# Patient Record
Sex: FEMALE | Race: WHITE | NOT HISPANIC OR LATINO | URBAN - METROPOLITAN AREA
[De-identification: names, ages, dates, MRNs, and addresses within clinical notes are randomized per-mention and may not be internally consistent; named-entity substitution may affect disease eponyms.]

---

## 2017-06-06 ENCOUNTER — APPOINTMENT (RX ONLY)
Dept: URBAN - METROPOLITAN AREA CLINIC 349 | Facility: CLINIC | Age: 22
Setting detail: DERMATOLOGY
End: 2017-06-06

## 2017-06-06 DIAGNOSIS — L73.9 FOLLICULAR DISORDER, UNSPECIFIED: ICD-10-CM

## 2017-06-06 DIAGNOSIS — L81.0 POSTINFLAMMATORY HYPERPIGMENTATION: ICD-10-CM

## 2017-06-06 DIAGNOSIS — L738 OTHER SPECIFIED DISEASES OF HAIR AND HAIR FOLLICLES: ICD-10-CM

## 2017-06-06 DIAGNOSIS — L70.0 ACNE VULGARIS: ICD-10-CM

## 2017-06-06 DIAGNOSIS — L663 OTHER SPECIFIED DISEASES OF HAIR AND HAIR FOLLICLES: ICD-10-CM

## 2017-06-06 PROBLEM — L02.425 FURUNCLE OF RIGHT LOWER LIMB: Status: ACTIVE | Noted: 2017-06-06

## 2017-06-06 PROBLEM — L02.426 FURUNCLE OF LEFT LOWER LIMB: Status: ACTIVE | Noted: 2017-06-06

## 2017-06-06 PROCEDURE — 99203 OFFICE O/P NEW LOW 30 MIN: CPT

## 2017-06-06 PROCEDURE — ? PRESCRIPTION

## 2017-06-06 PROCEDURE — ? COUNSELING

## 2017-06-06 PROCEDURE — ? TREATMENT REGIMEN

## 2017-06-06 RX ORDER — CLINDAMYCIN PHOSPHATE AND TRETINOIN 10; .25 MG/G; MG/G
GEL TOPICAL
Qty: 1 | Refills: 2 | Status: ERX | COMMUNITY
Start: 2017-06-06

## 2017-06-06 RX ORDER — DOXYCYCLINE HYCLATE 150 MG/1
TABLET, COATED ORAL
Qty: 30 | Refills: 2 | Status: ERX | COMMUNITY
Start: 2017-06-06

## 2017-06-06 RX ORDER — SPIRONOLACTONE 25 MG/1
TABLET, FILM COATED ORAL
Qty: 60 | Refills: 2 | Status: ERX | COMMUNITY
Start: 2017-06-06

## 2017-06-06 RX ADMIN — SPIRONOLACTONE: 25 TABLET, FILM COATED ORAL at 15:37

## 2017-06-06 RX ADMIN — CLINDAMYCIN PHOSPHATE AND TRETINOIN: 10; .25 GEL TOPICAL at 15:39

## 2017-06-06 RX ADMIN — DOXYCYCLINE HYCLATE: 150 TABLET, COATED ORAL at 15:37

## 2017-06-06 ASSESSMENT — LOCATION DETAILED DESCRIPTION DERM
LOCATION DETAILED: RIGHT DISTAL PRETIBIAL REGION
LOCATION DETAILED: RIGHT SUPERIOR LATERAL UPPER BACK
LOCATION DETAILED: INFERIOR THORACIC SPINE
LOCATION DETAILED: RIGHT POSTERIOR SHOULDER
LOCATION DETAILED: LEFT ANTERIOR DISTAL THIGH
LOCATION DETAILED: SUPERIOR LUMBAR SPINE
LOCATION DETAILED: RIGHT ANTERIOR DISTAL THIGH
LOCATION DETAILED: LEFT SUPERIOR UPPER BACK
LOCATION DETAILED: LEFT POSTERIOR SHOULDER
LOCATION DETAILED: LEFT PROXIMAL PRETIBIAL REGION

## 2017-06-06 ASSESSMENT — LOCATION SIMPLE DESCRIPTION DERM
LOCATION SIMPLE: LEFT UPPER BACK
LOCATION SIMPLE: RIGHT PRETIBIAL REGION
LOCATION SIMPLE: LEFT PRETIBIAL REGION
LOCATION SIMPLE: UPPER BACK
LOCATION SIMPLE: RIGHT THIGH
LOCATION SIMPLE: LEFT SHOULDER
LOCATION SIMPLE: RIGHT BACK
LOCATION SIMPLE: LOWER BACK
LOCATION SIMPLE: RIGHT SHOULDER
LOCATION SIMPLE: LEFT THIGH

## 2017-06-06 ASSESSMENT — LOCATION ZONE DERM
LOCATION ZONE: LEG
LOCATION ZONE: TRUNK
LOCATION ZONE: ARM

## 2017-06-06 NOTE — PROCEDURE: TREATMENT REGIMEN
Initiate Treatment: Spironolactone 25 mg to take 1 twice daily. Veltin to use pea size amount on face every night
Detail Level: Zone
Plan: Doxycycline 150 mg prescripbed for another diagnosis will treat this as well

## 2017-06-20 ENCOUNTER — RX ONLY (OUTPATIENT)
Age: 22
Setting detail: RX ONLY
End: 2017-06-20

## 2017-06-20 RX ORDER — CLINDAMYCIN PHOSPHATE AND TRETINOIN 10; .25 MG/G; MG/G
GEL TOPICAL
Qty: 1 | Refills: 2 | Status: ERX

## 2017-08-02 ENCOUNTER — APPOINTMENT (RX ONLY)
Dept: URBAN - METROPOLITAN AREA CLINIC 349 | Facility: CLINIC | Age: 22
Setting detail: DERMATOLOGY
End: 2017-08-02

## 2017-08-02 DIAGNOSIS — L70.0 ACNE VULGARIS: ICD-10-CM | Status: IMPROVED

## 2017-08-02 DIAGNOSIS — L73.9 FOLLICULAR DISORDER, UNSPECIFIED: ICD-10-CM | Status: IMPROVED

## 2017-08-02 DIAGNOSIS — L738 OTHER SPECIFIED DISEASES OF HAIR AND HAIR FOLLICLES: ICD-10-CM | Status: IMPROVED

## 2017-08-02 DIAGNOSIS — L663 OTHER SPECIFIED DISEASES OF HAIR AND HAIR FOLLICLES: ICD-10-CM | Status: IMPROVED

## 2017-08-02 PROBLEM — L02.425 FURUNCLE OF RIGHT LOWER LIMB: Status: ACTIVE | Noted: 2017-08-02

## 2017-08-02 PROBLEM — L02.426 FURUNCLE OF LEFT LOWER LIMB: Status: ACTIVE | Noted: 2017-08-02

## 2017-08-02 PROCEDURE — ? TREATMENT REGIMEN

## 2017-08-02 PROCEDURE — ? COUNSELING

## 2017-08-02 PROCEDURE — ? PRESCRIPTION

## 2017-08-02 PROCEDURE — 99213 OFFICE O/P EST LOW 20 MIN: CPT

## 2017-08-02 RX ORDER — SPIRONOLACTONE 25 MG/1
TABLET, FILM COATED ORAL
Qty: 60 | Refills: 5 | Status: ERX

## 2017-08-02 RX ORDER — DOXYCYCLINE HYCLATE 150 MG/1
TABLET, FILM COATED ORAL
Qty: 30 | Refills: 4 | Status: ERX

## 2017-08-02 ASSESSMENT — LOCATION DETAILED DESCRIPTION DERM
LOCATION DETAILED: LEFT INFERIOR CENTRAL MALAR CHEEK
LOCATION DETAILED: RIGHT INFERIOR CENTRAL MALAR CHEEK
LOCATION DETAILED: INFERIOR MID FOREHEAD
LOCATION DETAILED: LEFT ANTERIOR DISTAL THIGH
LOCATION DETAILED: RIGHT CHIN
LOCATION DETAILED: RIGHT ANTERIOR DISTAL THIGH

## 2017-08-02 ASSESSMENT — LOCATION ZONE DERM
LOCATION ZONE: LEG
LOCATION ZONE: FACE

## 2017-08-02 ASSESSMENT — LOCATION SIMPLE DESCRIPTION DERM
LOCATION SIMPLE: CHIN
LOCATION SIMPLE: INFERIOR FOREHEAD
LOCATION SIMPLE: RIGHT CHEEK
LOCATION SIMPLE: LEFT CHEEK
LOCATION SIMPLE: RIGHT THIGH
LOCATION SIMPLE: LEFT THIGH

## 2017-08-02 NOTE — PROCEDURE: TREATMENT REGIMEN
Continue Regimen: Current regimen on Doxycycline 150 to take 1 time Daily
Continue Regimen: Current regimen of Spironolactone 25 mgs twice daily, Veltin to use pea size amount on face at night
Detail Level: Zone

## 2018-07-19 ENCOUNTER — APPOINTMENT (RX ONLY)
Dept: URBAN - METROPOLITAN AREA CLINIC 349 | Facility: CLINIC | Age: 23
Setting detail: DERMATOLOGY
End: 2018-07-19

## 2018-07-19 DIAGNOSIS — L738 OTHER SPECIFIED DISEASES OF HAIR AND HAIR FOLLICLES: ICD-10-CM | Status: INADEQUATELY CONTROLLED

## 2018-07-19 DIAGNOSIS — L663 OTHER SPECIFIED DISEASES OF HAIR AND HAIR FOLLICLES: ICD-10-CM | Status: INADEQUATELY CONTROLLED

## 2018-07-19 DIAGNOSIS — L73.9 FOLLICULAR DISORDER, UNSPECIFIED: ICD-10-CM | Status: INADEQUATELY CONTROLLED

## 2018-07-19 PROBLEM — L02.426 FURUNCLE OF LEFT LOWER LIMB: Status: ACTIVE | Noted: 2018-07-19

## 2018-07-19 PROBLEM — L02.425 FURUNCLE OF RIGHT LOWER LIMB: Status: ACTIVE | Noted: 2018-07-19

## 2018-07-19 PROCEDURE — ? PRESCRIPTION

## 2018-07-19 PROCEDURE — ? RECOMMENDATIONS

## 2018-07-19 PROCEDURE — ? COUNSELING

## 2018-07-19 PROCEDURE — 99213 OFFICE O/P EST LOW 20 MIN: CPT

## 2018-07-19 RX ORDER — CLINDAMYCIN PHOSPHATE 10 MG/G
GEL TOPICAL
Qty: 1 | Refills: 2 | Status: ERX | COMMUNITY
Start: 2018-07-19

## 2018-07-19 RX ORDER — FLUTICASONE PROPIONATE 0.05 MG/G
OINTMENT TOPICAL
Qty: 1 | Refills: 1 | Status: ERX | COMMUNITY
Start: 2018-07-19

## 2018-07-19 RX ADMIN — FLUTICASONE PROPIONATE: 0.05 OINTMENT TOPICAL at 15:51

## 2018-07-19 RX ADMIN — CLINDAMYCIN PHOSPHATE: 10 GEL TOPICAL at 15:50

## 2018-07-19 ASSESSMENT — LOCATION ZONE DERM: LOCATION ZONE: LEG

## 2018-07-19 ASSESSMENT — LOCATION SIMPLE DESCRIPTION DERM
LOCATION SIMPLE: RIGHT THIGH
LOCATION SIMPLE: LEFT THIGH

## 2018-07-19 ASSESSMENT — LOCATION DETAILED DESCRIPTION DERM
LOCATION DETAILED: RIGHT ANTERIOR DISTAL THIGH
LOCATION DETAILED: LEFT ANTERIOR DISTAL THIGH

## 2018-07-19 NOTE — PROCEDURE: RECOMMENDATIONS
Recommendations (Free Text): Stop doxycycline \\nClindamycin gel twice daily\\nStop picking\\nFluticasone ointment twice daily to inflammed area x2 wks\\n\\nIf it fails to resolve consider biopsy (include PLEVA in differential)
Detail Level: Zone

## 2019-05-16 ENCOUNTER — APPOINTMENT (RX ONLY)
Dept: URBAN - METROPOLITAN AREA CLINIC 349 | Facility: CLINIC | Age: 24
Setting detail: DERMATOLOGY
End: 2019-05-16

## 2019-05-16 ENCOUNTER — RX ONLY (OUTPATIENT)
Age: 24
Setting detail: RX ONLY
End: 2019-05-16

## 2019-05-16 DIAGNOSIS — L73.9 FOLLICULAR DISORDER, UNSPECIFIED: ICD-10-CM | Status: RESOLVING

## 2019-05-16 DIAGNOSIS — B36.0 PITYRIASIS VERSICOLOR: ICD-10-CM

## 2019-05-16 DIAGNOSIS — L738 OTHER SPECIFIED DISEASES OF HAIR AND HAIR FOLLICLES: ICD-10-CM | Status: RESOLVING

## 2019-05-16 DIAGNOSIS — L70.0 ACNE VULGARIS: ICD-10-CM | Status: UNCHANGED

## 2019-05-16 DIAGNOSIS — L663 OTHER SPECIFIED DISEASES OF HAIR AND HAIR FOLLICLES: ICD-10-CM | Status: RESOLVING

## 2019-05-16 PROBLEM — L02.426 FURUNCLE OF LEFT LOWER LIMB: Status: ACTIVE | Noted: 2019-05-16

## 2019-05-16 PROBLEM — L02.425 FURUNCLE OF RIGHT LOWER LIMB: Status: ACTIVE | Noted: 2019-05-16

## 2019-05-16 PROCEDURE — ? PRESCRIPTION

## 2019-05-16 PROCEDURE — ? COUNSELING

## 2019-05-16 PROCEDURE — ? OTHER

## 2019-05-16 PROCEDURE — 99214 OFFICE O/P EST MOD 30 MIN: CPT

## 2019-05-16 PROCEDURE — ? TREATMENT REGIMEN

## 2019-05-16 RX ORDER — DOXYCYCLINE HYCLATE 100 MG/1
TABLET, COATED ORAL
Qty: 30 | Refills: 2 | Status: ERX | COMMUNITY
Start: 2019-05-16

## 2019-05-16 RX ORDER — DAPSONE 50 MG/G
GEL TOPICAL
Qty: 1 | Refills: 2 | Status: ERX | COMMUNITY
Start: 2019-05-16

## 2019-05-16 RX ORDER — IODOQUINOL, HYDROCORTISONE ACETATE AND ALOE VERA LEAF 10; 20; 10 MG/G; MG/G; MG/G
GEL TOPICAL
Qty: 1 | Refills: 2 | Status: ERX | COMMUNITY
Start: 2019-05-16

## 2019-05-16 RX ORDER — SPIRONOLACTONE 50 MG/1
TABLET, FILM COATED ORAL
Qty: 30 | Refills: 6 | Status: ERX | COMMUNITY
Start: 2019-05-16

## 2019-05-16 RX ORDER — DAPSONE 75 MG/G
GEL TOPICAL
Qty: 1 | Refills: 6 | Status: ERX | COMMUNITY
Start: 2019-05-16

## 2019-05-16 RX ADMIN — SPIRONOLACTONE: 50 TABLET, FILM COATED ORAL at 14:41

## 2019-05-16 RX ADMIN — DOXYCYCLINE HYCLATE: 100 TABLET, COATED ORAL at 14:41

## 2019-05-16 RX ADMIN — IODOQUINOL, HYDROCORTISONE ACETATE AND ALOE VERA LEAF: 10; 20; 10 GEL TOPICAL at 14:39

## 2019-05-16 RX ADMIN — DAPSONE: 75 GEL TOPICAL at 14:41

## 2019-05-16 ASSESSMENT — LOCATION SIMPLE DESCRIPTION DERM
LOCATION SIMPLE: LEFT THIGH
LOCATION SIMPLE: INFERIOR FOREHEAD
LOCATION SIMPLE: RIGHT CHEEK
LOCATION SIMPLE: LEFT CHEEK
LOCATION SIMPLE: ABDOMEN
LOCATION SIMPLE: CHIN
LOCATION SIMPLE: RIGHT THIGH

## 2019-05-16 ASSESSMENT — LOCATION DETAILED DESCRIPTION DERM
LOCATION DETAILED: LEFT LATERAL ABDOMEN
LOCATION DETAILED: LEFT INFERIOR CENTRAL MALAR CHEEK
LOCATION DETAILED: INFERIOR MID FOREHEAD
LOCATION DETAILED: RIGHT ANTERIOR DISTAL THIGH
LOCATION DETAILED: LEFT ANTERIOR DISTAL THIGH
LOCATION DETAILED: RIGHT INFERIOR CENTRAL MALAR CHEEK
LOCATION DETAILED: RIGHT CHIN

## 2019-05-16 ASSESSMENT — LOCATION ZONE DERM
LOCATION ZONE: LEG
LOCATION ZONE: TRUNK
LOCATION ZONE: FACE

## 2019-05-16 NOTE — PROCEDURE: COUNSELING
Minocycline Pregnancy And Lactation Text: This medication is Pregnancy Category D and not consider safe during pregnancy. It is also excreted in breast milk.
Topical Clindamycin Pregnancy And Lactation Text: This medication is Pregnancy Category B and is considered safe during pregnancy. It is unknown if it is excreted in breast milk.
Azithromycin Pregnancy And Lactation Text: This medication is considered safe during pregnancy and is also secreted in breast milk.
Topical Sulfur Applications Counseling: Topical Sulfur Counseling: Patient counseled that this medication may cause skin irritation or allergic reactions.  In the event of skin irritation, the patient was advised to reduce the amount of the drug applied or use it less frequently.   The patient verbalized understanding of the proper use and possible adverse effects of topical sulfur application.  All of the patient's questions and concerns were addressed.
Isotretinoin Counseling: Patient should get monthly blood tests, not donate blood, not drive at night if vision affected, not share medication, and not undergo elective surgery for 6 months after tx completed. Side effects reviewed, pt to contact office should one occur.
Doxycycline Counseling:  Patient counseled regarding possible photosensitivity and increased risk for sunburn.  Patient instructed to avoid sunlight, if possible.  When exposed to sunlight, patients should wear protective clothing, sunglasses, and sunscreen.  The patient was instructed to call the office immediately if the following severe adverse effects occur:  hearing changes, easy bruising/bleeding, severe headache, or vision changes.  The patient verbalized understanding of the proper use and possible adverse effects of doxycycline.  All of the patient's questions and concerns were addressed.
Dapsone Counseling: I discussed with the patient the risks of dapsone including but not limited to hemolytic anemia, agranulocytosis, rashes, methemoglobinemia, kidney failure, peripheral neuropathy, headaches, GI upset, and liver toxicity.  Patients who start dapsone require monitoring including baseline LFTs and weekly CBCs for the first month, then every month thereafter.  The patient verbalized understanding of the proper use and possible adverse effects of dapsone.  All of the patient's questions and concerns were addressed.
High Dose Vitamin A Pregnancy And Lactation Text: High dose vitamin A therapy is contraindicated during pregnancy and breast feeding.
Isotretinoin Pregnancy And Lactation Text: This medication is Pregnancy Category X and is considered extremely dangerous during pregnancy. It is unknown if it is excreted in breast milk.
Minocycline Counseling: Patient advised regarding possible photosensitivity and discoloration of the teeth, skin, lips, tongue and gums.  Patient instructed to avoid sunlight, if possible.  When exposed to sunlight, patients should wear protective clothing, sunglasses, and sunscreen.  The patient was instructed to call the office immediately if the following severe adverse effects occur:  hearing changes, easy bruising/bleeding, severe headache, or vision changes.  The patient verbalized understanding of the proper use and possible adverse effects of minocycline.  All of the patient's questions and concerns were addressed.
Topical Clindamycin Counseling: Patient counseled that this medication may cause skin irritation or allergic reactions.  In the event of skin irritation, the patient was advised to reduce the amount of the drug applied or use it less frequently.   The patient verbalized understanding of the proper use and possible adverse effects of clindamycin.  All of the patient's questions and concerns were addressed.
Detail Level: Zone
Topical Retinoid Pregnancy And Lactation Text: This medication is Pregnancy Category C. It is unknown if this medication is excreted in breast milk.
Tetracycline Counseling: Patient counseled regarding possible photosensitivity and increased risk for sunburn.  Patient instructed to avoid sunlight, if possible.  When exposed to sunlight, patients should wear protective clothing, sunglasses, and sunscreen.  The patient was instructed to call the office immediately if the following severe adverse effects occur:  hearing changes, easy bruising/bleeding, severe headache, or vision changes.  The patient verbalized understanding of the proper use and possible adverse effects of tetracycline.  All of the patient's questions and concerns were addressed. Patient understands to avoid pregnancy while on therapy due to potential birth defects.
Topical Sulfur Applications Pregnancy And Lactation Text: This medication is Pregnancy Category C and has an unknown safety profile during pregnancy. It is unknown if this topical medication is excreted in breast milk.
Tazorac Pregnancy And Lactation Text: This medication is not safe during pregnancy. It is unknown if this medication is excreted in breast milk.
Erythromycin Pregnancy And Lactation Text: This medication is Pregnancy Category B and is considered safe during pregnancy. It is also excreted in breast milk.
High Dose Vitamin A Counseling: Side effects reviewed, pt to contact office should one occur.
Dapsone Pregnancy And Lactation Text: This medication is Pregnancy Category C and is not considered safe during pregnancy or breast feeding.
Benzoyl Peroxide Counseling: Patient counseled that medicine may cause skin irritation and bleach clothing.  In the event of skin irritation, the patient was advised to reduce the amount of the drug applied or use it less frequently.   The patient verbalized understanding of the proper use and possible adverse effects of benzoyl peroxide.  All of the patient's questions and concerns were addressed.
Spironolactone Counseling: Patient advised regarding risks of diarrhea, abdominal pain, hyperkalemia, birth defects (for female patients), liver toxicity and renal toxicity. The patient may need blood work to monitor liver and kidney function and potassium levels while on therapy. The patient verbalized understanding of the proper use and possible adverse effects of spironolactone.  All of the patient's questions and concerns were addressed.
Birth Control Pills Counseling: Birth Control Pill Counseling: I discussed with the patient the potential side effects of OCPs including but not limited to increased risk of stroke, heart attack, thrombophlebitis, deep venous thrombosis, hepatic adenomas, breast changes, GI upset, headaches, and depression.  The patient verbalized understanding of the proper use and possible adverse effects of OCPs. All of the patient's questions and concerns were addressed.
Azithromycin Counseling:  I discussed with the patient the risks of azithromycin including but not limited to GI upset, allergic reaction, drug rash, diarrhea, and yeast infections.
Topical Retinoid counseling:  Patient advised to apply a pea-sized amount only at bedtime and wait 30 minutes after washing their face before applying.  If too drying, patient may add a non-comedogenic moisturizer. The patient verbalized understanding of the proper use and possible adverse effects of retinoids.  All of the patient's questions and concerns were addressed.
Doxycycline Pregnancy And Lactation Text: This medication is Pregnancy Category D and not consider safe during pregnancy. It is also excreted in breast milk but is considered safe for shorter treatment courses.
Benzoyl Peroxide Pregnancy And Lactation Text: This medication is Pregnancy Category C. It is unknown if benzoyl peroxide is excreted in breast milk.
Spironolactone Pregnancy And Lactation Text: This medication can cause feminization of the male fetus and should be avoided during pregnancy. The active metabolite is also found in breast milk.
Birth Control Pills Pregnancy And Lactation Text: This medication should be avoided if pregnant and for the first 30 days post-partum.
Bactrim Pregnancy And Lactation Text: This medication is Pregnancy Category D and is known to cause fetal risk.  It is also excreted in breast milk.
Bactrim Counseling:  I discussed with the patient the risks of sulfa antibiotics including but not limited to GI upset, allergic reaction, drug rash, diarrhea, dizziness, photosensitivity, and yeast infections.  Rarely, more serious reactions can occur including but not limited to aplastic anemia, agranulocytosis, methemoglobinemia, blood dyscrasias, liver or kidney failure, lung infiltrates or desquamative/blistering drug rashes.
Tazorac Counseling:  Patient advised that medication is irritating and drying.  Patient may need to apply sparingly and wash off after an hour before eventually leaving it on overnight.  The patient verbalized understanding of the proper use and possible adverse effects of tazorac.  All of the patient's questions and concerns were addressed.
Erythromycin Counseling:  I discussed with the patient the risks of erythromycin including but not limited to GI upset, allergic reaction, drug rash, diarrhea, increase in liver enzymes, and yeast infections.

## 2019-05-16 NOTE — HPI: PIMPLES (ACNE)
How Severe Is Your Acne?: mild
Is This A New Presentation, Or A Follow-Up?: Acne
Additional Comments (Use Complete Sentences): Pt stated she was on Spironolactone and doxycycline for the acne on her back and was told at one of her last acne appointments to discontinue using the products. Pt is currently only washing her face with OTC face wash. Pt denies any from of oral contraceptive. Pt stated that recently she has started getting cyst like bumps on her face that she can’t get any contents out. She is left with some hyperpigmentation in the area.

## 2019-05-16 NOTE — PROCEDURE: TREATMENT REGIMEN
Detail Level: Zone
Action 1: Continue
Hide Differin Products: No
Start Regimen: Doxycycline 100mg once daily \\nSpironolactone 50mg once daily\\nAczone apply to face once daily
Initiate Treatment: Alcortin apply to affected area twice daily
Continue Regimen: Clindamycin apply  to affected area twice daily \\nFluticasone apply to affected area twice daily

## 2019-05-16 NOTE — PROCEDURE: OTHER
Note Text (......Xxx Chief Complaint.): This diagnosis correlates with the
Detail Level: Detailed
Other (Free Text): Patient states she has been picking at the areas. Patient states she is mostly breaking out on her upper legs. Patient states she is flared today but it gets more severe. \\n\\nClaire discussed putting patient on an antibiotic for her acne so she is hoping that will help with the Folliculitis as well.\\n\\nAppears better than in picture so no biopsy today. Worse in bikini area today
Other (Free Text): Patient is not on any type of birth control. \\n\\nClaire disucsssed the risks and side effects of spironolactone and that it could take three to five months to see improvement.

## 2020-09-03 ENCOUNTER — EMPLOYEE WELLNESS (OUTPATIENT)
Dept: OTHER | Facility: CLINIC | Age: 25
End: 2020-09-03

## 2020-09-28 ENCOUNTER — APPOINTMENT (OUTPATIENT)
Dept: GENERAL RADIOLOGY | Age: 25
End: 2020-09-28
Attending: EMERGENCY MEDICINE
Payer: COMMERCIAL

## 2020-09-28 ENCOUNTER — NURSE TRIAGE (OUTPATIENT)
Dept: OTHER | Facility: CLINIC | Age: 25
End: 2020-09-28

## 2020-09-28 ENCOUNTER — HOSPITAL ENCOUNTER (EMERGENCY)
Age: 25
Discharge: HOME | End: 2020-09-28
Attending: EMERGENCY MEDICINE
Payer: COMMERCIAL

## 2020-09-28 VITALS
TEMPERATURE: 97.8 F | OXYGEN SATURATION: 99 % | HEART RATE: 63 BPM | HEIGHT: 66 IN | BODY MASS INDEX: 21.69 KG/M2 | SYSTOLIC BLOOD PRESSURE: 102 MMHG | RESPIRATION RATE: 20 BRPM | DIASTOLIC BLOOD PRESSURE: 67 MMHG | WEIGHT: 135 LBS

## 2020-09-28 DIAGNOSIS — B34.9 VIRAL SYNDROME: ICD-10-CM

## 2020-09-28 DIAGNOSIS — R06.00 DYSPNEA, UNSPECIFIED TYPE: Primary | ICD-10-CM

## 2020-09-28 LAB
ALBUMIN SERPL-MCNC: 3.7 G/DL (ref 3.5–5)
ALBUMIN/GLOB SERPL: 0.8 {RATIO} (ref 1.2–3.5)
ALP SERPL-CCNC: 102 U/L (ref 50–136)
ALT SERPL-CCNC: 25 U/L (ref 12–65)
ANION GAP SERPL CALC-SCNC: 4 MMOL/L (ref 7–16)
APPEARANCE UR: CLEAR
AST SERPL-CCNC: 15 U/L (ref 15–37)
BACTERIA URNS QL MICRO: 0 /HPF
BASOPHILS # BLD: 0 K/UL (ref 0–0.2)
BASOPHILS NFR BLD: 0 % (ref 0–2)
BILIRUB SERPL-MCNC: 0.3 MG/DL (ref 0.2–1.1)
BILIRUB UR QL: NEGATIVE
BUN SERPL-MCNC: 15 MG/DL (ref 6–23)
CALCIUM SERPL-MCNC: 9.2 MG/DL (ref 8.3–10.4)
CASTS URNS QL MICRO: ABNORMAL /LPF
CHLORIDE SERPL-SCNC: 107 MMOL/L (ref 98–107)
CO2 SERPL-SCNC: 30 MMOL/L (ref 21–32)
COLOR UR: YELLOW
CREAT SERPL-MCNC: 0.7 MG/DL (ref 0.6–1)
D DIMER PPP FEU-MCNC: <0.27 UG/ML(FEU)
DIFFERENTIAL METHOD BLD: ABNORMAL
EOSINOPHIL # BLD: 0.1 K/UL (ref 0–0.8)
EOSINOPHIL NFR BLD: 1 % (ref 0.5–7.8)
EPI CELLS #/AREA URNS HPF: ABNORMAL /HPF
ERYTHROCYTE [DISTWIDTH] IN BLOOD BY AUTOMATED COUNT: 11.8 % (ref 11.9–14.6)
GLOBULIN SER CALC-MCNC: 4.5 G/DL (ref 2.3–3.5)
GLUCOSE SERPL-MCNC: 99 MG/DL (ref 65–100)
GLUCOSE UR STRIP.AUTO-MCNC: NEGATIVE MG/DL
HCT VFR BLD AUTO: 36.6 % (ref 35.8–46.3)
HGB BLD-MCNC: 12.4 G/DL (ref 11.7–15.4)
HGB UR QL STRIP: ABNORMAL
IMM GRANULOCYTES # BLD AUTO: 0 K/UL (ref 0–0.5)
IMM GRANULOCYTES NFR BLD AUTO: 0 % (ref 0–5)
KETONES UR QL STRIP.AUTO: NEGATIVE MG/DL
LEUKOCYTE ESTERASE UR QL STRIP.AUTO: NEGATIVE
LYMPHOCYTES # BLD: 3.3 K/UL (ref 0.5–4.6)
LYMPHOCYTES NFR BLD: 39 % (ref 13–44)
MCH RBC QN AUTO: 28.4 PG (ref 26.1–32.9)
MCHC RBC AUTO-ENTMCNC: 33.9 G/DL (ref 31.4–35)
MCV RBC AUTO: 83.8 FL (ref 79.6–97.8)
MONOCYTES # BLD: 0.4 K/UL (ref 0.1–1.3)
MONOCYTES NFR BLD: 5 % (ref 4–12)
NEUTS SEG # BLD: 4.7 K/UL (ref 1.7–8.2)
NEUTS SEG NFR BLD: 55 % (ref 43–78)
NITRITE UR QL STRIP.AUTO: NEGATIVE
NRBC # BLD: 0 K/UL (ref 0–0.2)
PH UR STRIP: 7 [PH] (ref 5–9)
PLATELET # BLD AUTO: 280 K/UL (ref 150–450)
PMV BLD AUTO: 10.4 FL (ref 9.4–12.3)
POTASSIUM SERPL-SCNC: 3.8 MMOL/L (ref 3.5–5.1)
PROT SERPL-MCNC: 8.2 G/DL (ref 6.3–8.2)
PROT UR STRIP-MCNC: NEGATIVE MG/DL
RBC # BLD AUTO: 4.37 M/UL (ref 4.05–5.2)
RBC #/AREA URNS HPF: ABNORMAL /HPF
SODIUM SERPL-SCNC: 141 MMOL/L (ref 136–145)
SP GR UR REFRACTOMETRY: 1.01 (ref 1–1.02)
UROBILINOGEN UR QL STRIP.AUTO: 0.2 EU/DL (ref 0.2–1)
WBC # BLD AUTO: 8.5 K/UL (ref 4.3–11.1)
WBC URNS QL MICRO: ABNORMAL /HPF

## 2020-09-28 PROCEDURE — 85379 FIBRIN DEGRADATION QUANT: CPT

## 2020-09-28 PROCEDURE — 71045 X-RAY EXAM CHEST 1 VIEW: CPT

## 2020-09-28 PROCEDURE — 85025 COMPLETE CBC W/AUTO DIFF WBC: CPT

## 2020-09-28 PROCEDURE — 81001 URINALYSIS AUTO W/SCOPE: CPT

## 2020-09-28 PROCEDURE — 80053 COMPREHEN METABOLIC PANEL: CPT

## 2020-09-28 PROCEDURE — 93005 ELECTROCARDIOGRAM TRACING: CPT | Performed by: EMERGENCY MEDICINE

## 2020-09-28 PROCEDURE — 99284 EMERGENCY DEPT VISIT MOD MDM: CPT

## 2020-09-28 NOTE — ED TRIAGE NOTES
Patient ambulatory to triage with mask in place. Patient reports she was dx with COVID on 9/10. Pt reports fever for 4-5 days. Pt reports increased fatigue, shob with exertion. Pt also report increased HR and chest pain with exertion.

## 2020-09-28 NOTE — TELEPHONE ENCOUNTER
Patient called in via employee health benefit line to report symptoms of worsening Covid-19 symptoms. Reason for Disposition   MODERATE difficulty breathing (e.g., speaks in phrases, SOB even at rest, pulse 100-120)    Answer Assessment - Initial Assessment Questions  1. COVID-19 DIAGNOSIS: \"Who made your Coronavirus (COVID-19) diagnosis? \" \"Was it confirmed by a positive lab test?\" If not diagnosed by a HCP, ask \"Are there lots of cases (community spread) where you live? \" (See public health department website, if unsure)      3 weeks  2. ONSET: \"When did the COVID-19 symptoms start? \"       3 weeks  3. WORST SYMPTOM: \"What is your worst symptom? \" (e.g., cough, fever, shortness of breath, muscle aches)      Fatigue, intermittent chest pain  4. COUGH: \"Do you have a cough? \" If so, ask: \"How bad is the cough? \"        Yes - mild  5. FEVER: \"Do you have a fever? \" If so, ask: \"What is your temperature, how was it measured, and when did it start? \"      No  6. RESPIRATORY STATUS: \"Describe your breathing? \" (e.g., shortness of breath, wheezing, unable to speak)       Shortness of breath at times  7. BETTER-SAME-WORSE: Lois Millan you getting better, staying the same or getting worse compared to yesterday? \"  If getting worse, ask, \"In what way? \"      Worse - fatigue worsening and chest pain with exertion  8. HIGH RISK DISEASE: \"Do you have any chronic medical problems? \" (e.g., asthma, heart or lung disease, weak immune system, etc.)      No  9. PREGNANCY: \"Is there any chance you are pregnant? \" \"When was your last menstrual period? \"      No  10. OTHER SYMPTOMS: \"Do you have any other symptoms? \"  (e.g., chills, fatigue, headache, loss of smell or taste, muscle pain, sore throat)        Fatigue, chest pain    Protocols used: CORONAVIRUS (COVID-19) DIAGNOSED OR SUSPECTED-ADULT-    Informed of disposition. Care advice as documented. Caller verbalized understanding and denies any further questions/concerns.      Attention provider: Your patient utilized nurse triage services offered by an employer, payer or community. This encounter includes an overview of the reason for call, assessment and recommended disposition. Please do not respond through this encounter as the response is not directed to a shared pool.

## 2020-09-29 ENCOUNTER — PATIENT OUTREACH (OUTPATIENT)
Dept: CASE MANAGEMENT | Age: 25
End: 2020-09-29

## 2020-09-29 NOTE — PROGRESS NOTES
Transitions of Care outreach in follow up to ED visit last evening 9/28/2020  Patient presented with fatigue and exertional dyspnea with know COVID diagnosis x 3 weeks. Able to reach patient via [address] She reports feeling somewhat better. She is resting and hydrating.   PCP follow up appointment 9/30/2020    No plans for further follow up

## 2020-09-29 NOTE — ED NOTES
I have reviewed discharge instructions with the patient. The patient verbalized understanding. Patient left ED via Discharge Method: ambulatory to Home with mother. Opportunity for questions and clarification provided. Patient given 0 scripts. To continue your aftercare when you leave the hospital, you may receive an automated call from our care team to check in on how you are doing. This is a free service and part of our promise to provide the best care and service to meet your aftercare needs.  If you have questions, or wish to unsubscribe from this service please call [phone]. Thank you for Choosing our [facility].

## 2020-09-29 NOTE — ED PROVIDER NOTES
Patient presents the ER complaining of fatigue and exertional dyspnea. Patient is approximately 3 weeks status post diagnosis of COVID-19. Denies any recurrence of fevers or chills. States she has had some cough. Recently went to urgent care last week and started on doxycycline albuterol. States she still having significant exertional dyspnea. Denies any chest pain. Reports some pain sometimes in her mid back. Denies any vomiting or diarrhea. States she has an episode today where she became very lightheaded and fatigue. The history is provided by the patient. Shortness of Breath   This is a recurrent problem. The problem occurs frequently. The current episode started more than 2 days ago. The problem has not changed since onset. Pertinent negatives include no fever, no sore throat, no neck pain, no sputum production, no hemoptysis, no wheezing, no orthopnea, no chest pain, no vomiting and no abdominal pain. She has tried beta-agonist inhalers for the symptoms.         Past Medical History:   Diagnosis Date    Chronic kidney disease since age 1    reflux  to kidneys-- bilat per mom    Urinary tract infection, site not specified     Vesicoureteral reflux, unspecified or without reflux nephropathy 1/7/2014       Past Surgical History:   Procedure Laterality Date    HX UROLOGICAL           Family History:   Problem Relation Age of Onset    Diabetes Father     Hypertension Father     Hypertension Mother    Barnetta Hay Anemia Mother        Social History     Socioeconomic History    Marital status: SINGLE     Spouse name: Not on file    Number of children: Not on file    Years of education: Not on file    Highest education level: Not on file   Occupational History    Not on file   Social Needs    Financial resource strain: Not on file    Food insecurity     Worry: Not on file     Inability: Not on file    Transportation needs     Medical: Not on file     Non-medical: Not on file   Tobacco Use    Smoking status: Never Smoker    Smokeless tobacco: Never Used   Substance and Sexual Activity    Alcohol use: No    Drug use: No    Sexual activity: Not Currently   Lifestyle    Physical activity     Days per week: Not on file     Minutes per session: Not on file    Stress: Not on file   Relationships    Social connections     Talks on phone: Not on file     Gets together: Not on file     Attends Synagogue service: Not on file     Active member of club or organization: Not on file     Attends meetings of clubs or organizations: Not on file     Relationship status: Not on file    Intimate partner violence     Fear of current or ex partner: Not on file     Emotionally abused: Not on file     Physically abused: Not on file     Forced sexual activity: Not on file   Other Topics Concern    Not on file   Social History Narrative    Not on file         ALLERGIES: Patient has no known allergies. Review of Systems   Constitutional: Positive for fatigue. Negative for fever. HENT: Negative for congestion, dental problem and sore throat. Eyes: Negative for redness and visual disturbance. Respiratory: Positive for chest tightness and shortness of breath. Negative for hemoptysis, sputum production and wheezing. Cardiovascular: Negative for chest pain and orthopnea. Gastrointestinal: Negative for abdominal pain, blood in stool and vomiting. Genitourinary: Negative for flank pain, frequency and urgency. Musculoskeletal: Negative for neck pain and neck stiffness. Skin: Negative for color change and pallor. Neurological: Positive for light-headedness. Hematological: Negative for adenopathy. Does not bruise/bleed easily. All other systems reviewed and are negative. Vitals:    09/28/20 1847   BP: 138/86   Pulse: 98   Resp: 20   Temp: 97.8 °F (36.6 °C)   SpO2: 99%   Weight: 61.2 kg (135 lb)   Height: 5' 6\" (1.676 m)            Physical Exam  Vitals signs and nursing note reviewed.    Constitutional: Appearance: Normal appearance. She is well-developed. HENT:      Head: Normocephalic and atraumatic. Nose: Nose normal.      Mouth/Throat:      Mouth: Mucous membranes are moist.   Eyes:      Extraocular Movements: Extraocular movements intact. Conjunctiva/sclera: Conjunctivae normal.      Pupils: Pupils are equal, round, and reactive to light. Neck:      Musculoskeletal: Normal range of motion and neck supple. No neck rigidity or muscular tenderness. Cardiovascular:      Rate and Rhythm: Normal rate and regular rhythm. Pulses: Normal pulses. Heart sounds: Normal heart sounds. No murmur. Pulmonary:      Effort: Pulmonary effort is normal. No respiratory distress. Breath sounds: Normal breath sounds. No stridor. Abdominal:      General: Abdomen is flat. Bowel sounds are normal. There is no distension. Palpations: There is no mass. Hernia: No hernia is present. Musculoskeletal: Normal range of motion. General: No swelling or tenderness. Right lower leg: No edema. Left lower leg: No edema. Skin:     General: Skin is warm and dry. Capillary Refill: Capillary refill takes less than 2 seconds. Coloration: Skin is not jaundiced. Neurological:      General: No focal deficit present. Mental Status: She is alert and oriented to person, place, and time. Mental status is at baseline. Cranial Nerves: No cranial nerve deficit. Motor: No weakness. MDM  Number of Diagnoses or Management Options  Diagnosis management comments: Well-appearing here with normal vital signs. Plan obtain screening labs, chest x-ray. Will obtain urinalysis and urine pregnancy test as well    10:37 PM  Normal labs here including normal white blood cell count. Urinalysis does show trace blood without any gross signs of infection    Chest x-ray clear, negative d-dimer. Vital signs remained stable here.   I see no further indication for any new testing at this time. Continue outpatient symptomatic care, follow-up with PCP. Discussed with patient results of testing and post viral expected disease course       Amount and/or Complexity of Data Reviewed  Clinical lab tests: ordered and reviewed  Tests in the radiology section of CPT®: ordered and reviewed           Procedures        Results Include:    Recent Results (from the past 24 hour(s))   METABOLIC PANEL, COMPREHENSIVE    Collection Time: 09/28/20  6:51 PM   Result Value Ref Range    Sodium 141 136 - 145 mmol/L    Potassium 3.8 3.5 - 5.1 mmol/L    Chloride 107 98 - 107 mmol/L    CO2 30 21 - 32 mmol/L    Anion gap 4 (L) 7 - 16 mmol/L    Glucose 99 65 - 100 mg/dL    BUN 15 6 - 23 MG/DL    Creatinine 0.70 0.6 - 1.0 MG/DL    GFR est AA >60 >60 ml/min/1.73m2    GFR est non-AA >60 >60 ml/min/1.73m2    Calcium 9.2 8.3 - 10.4 MG/DL    Bilirubin, total 0.3 0.2 - 1.1 MG/DL    ALT (SGPT) 25 12 - 65 U/L    AST (SGOT) 15 15 - 37 U/L    Alk. phosphatase 102 50 - 136 U/L    Protein, total 8.2 6.3 - 8.2 g/dL    Albumin 3.7 3.5 - 5.0 g/dL    Globulin 4.5 (H) 2.3 - 3.5 g/dL    A-G Ratio 0.8 (L) 1.2 - 3.5     CBC WITH AUTOMATED DIFF    Collection Time: 09/28/20  6:51 PM   Result Value Ref Range    WBC 8.5 4.3 - 11.1 K/uL    RBC 4.37 4.05 - 5.2 M/uL    HGB 12.4 11.7 - 15.4 g/dL    HCT 36.6 35.8 - 46.3 %    MCV 83.8 79.6 - 97.8 FL    MCH 28.4 26.1 - 32.9 PG    MCHC 33.9 31.4 - 35.0 g/dL    RDW 11.8 (L) 11.9 - 14.6 %    PLATELET 595 680 - 775 K/uL    MPV 10.4 9.4 - 12.3 FL    ABSOLUTE NRBC 0.00 0.0 - 0.2 K/uL    DF AUTOMATED      NEUTROPHILS 55 43 - 78 %    LYMPHOCYTES 39 13 - 44 %    MONOCYTES 5 4.0 - 12.0 %    EOSINOPHILS 1 0.5 - 7.8 %    BASOPHILS 0 0.0 - 2.0 %    IMMATURE GRANULOCYTES 0 0.0 - 5.0 %    ABS. NEUTROPHILS 4.7 1.7 - 8.2 K/UL    ABS. LYMPHOCYTES 3.3 0.5 - 4.6 K/UL    ABS. MONOCYTES 0.4 0.1 - 1.3 K/UL    ABS. EOSINOPHILS 0.1 0.0 - 0.8 K/UL    ABS. BASOPHILS 0.0 0.0 - 0.2 K/UL    ABS. IMM.  GRANS. 0.0 0.0 - 0.5 K/UL D DIMER    Collection Time: 09/28/20  6:51 PM   Result Value Ref Range    D DIMER <0.27 <0.56 ug/ml(FEU)   URINALYSIS W/ RFLX MICROSCOPIC    Collection Time: 09/28/20  8:51 PM   Result Value Ref Range    Color YELLOW      Appearance CLEAR      Specific gravity 1.015 1.001 - 1.023      pH (UA) 7.0 5.0 - 9.0      Protein Negative NEG mg/dL    Glucose Negative mg/dL    Ketone Negative NEG mg/dL    Bilirubin Negative NEG      Blood MODERATE (A) NEG      Urobilinogen 0.2 0.2 - 1.0 EU/dL    Nitrites Negative NEG      Leukocyte Esterase Negative NEG      WBC 3-5 0 /hpf    RBC 20-50 0 /hpf    Epithelial cells 0-3 0 /hpf    Bacteria 0 0 /hpf    Casts 0-3 0 /lpf     Voice dictation software was used during the making of this note. This software is not perfect and grammatical and other typographical errors may be present. This note has been proofread, but may still contain errors.   Nyla Dunaway MD; 9/28/2020 @10:38 PM   ===================================================================

## 2020-09-29 NOTE — DISCHARGE INSTRUCTIONS
As we discussed, your testing done today shows no signs of any serious or life-threatening illnesses  Some of your symptoms are to be expected given your recent viral infection  It is important that you get plenty of rest, drink plenty fluids  Continue all medications previously prescribed  Follow-up with your primary care physician  Return to the ER for any new, worsening or life-threatening symptoms      Learning About Coronavirus (COVID-19)  Coronavirus (COVID-19): Overview  What is coronavirus (COVID-19)? The coronavirus disease (COVID-19) is caused by a virus. It is an illness that was first found in December 2019. It has since spread worldwide. The virus can cause fever, cough, and trouble breathing. In severe cases, it can cause pneumonia and make it hard to breathe without help. It can cause death. This virus spreads person-to-person through droplets from coughing and sneezing. It can also spread when you are close to someone who is infected. And it can spread when you touch something that has the virus on it, such as a doorknob or a tabletop. Coronaviruses are a large group of viruses. They cause the common cold. They also cause more serious illnesses like [place] respiratory syndrome (MERS) and severe acute respiratory syndrome (SARS). COVID-19 is caused by a novel coronavirus. That means it's a new type that has not been seen in people before. How is COVID-19 treated? Mild illness can be treated at home, but more serious illness needs to be treated in the hospital. Treatment may include medicines to reduce symptoms, plus breathing support such as oxygen therapy or a ventilator. Other treatments, such as antiviral medicines, may help people who have COVID-19. What can you do to protect yourself from COVID-19? The best way to protect yourself from getting sick is to:  · Avoid areas where there is an outbreak. · Avoid contact with people who may be infected.   · Avoid crowds and try to stay at least 6 feet away from other people. · Wash your hands often, especially after you cough or sneeze. Use soap and water, and scrub for at least 20 seconds. If soap and water aren't available, use an alcohol-based hand . · Avoid touching your mouth, nose, and eyes. What can you do to avoid spreading the virus to others? To help avoid spreading the virus to others:  · Wash your hands often with soap or alcohol-based hand sanitizers. · Cover your mouth with a tissue when you cough or sneeze. Then throw the tissue in the trash. · Use a disinfectant to clean things that you touch often. These include doorknobs, remote controls, phones, and handles on your refrigerator and microwave. And don't forget countertops, tabletops, bathrooms, and computer keyboards. · Wear a cloth face cover if you have to go to public areas. If you know or suspect that you have COVID-19:  · Stay home. Don't go to school, work, or public areas. And don't use public transportation, ride-shares, or taxis unless you have no choice. · Leave your home only if you need to get medical care or testing. But call the doctor's office first so they know you're coming. And wear a face cover. · Limit contact with people in your home. If possible, stay in a separate bedroom and use a separate bathroom. · Wear a face cover whenever you're around other people. It can help stop the spread of the virus when you cough or sneeze. · Clean and disinfect your home every day. Use household  and disinfectant wipes or sprays. Take special care to clean things that you grab with your hands. · Self-isolate until it's safe to be around others again. ? If you have symptoms, it's safe when you haven't had a fever for 3 days and your symptoms have improved and it's been at least 10 days since your symptoms started. ? If you were exposed to the virus but don't have symptoms, it's safe to be around others 14 days after exposure.   ? Talk to your doctor about whether you also need testing, especially if you have a weakened immune system. When to call for help  Call 911 anytime you think you may need emergency care. For example, call if:  · You have severe trouble breathing. (You can't talk at all.)  · You have constant chest pain or pressure. · You are severely dizzy or lightheaded. · You are confused or can't think clearly. · Your face and lips have a blue color. · You passed out (lost consciousness) or are very hard to wake up. Call your doctor now if you develop symptoms such as:  · Shortness of breath. · Fever. · Cough. If you need to get care, call ahead to the doctor's office for instructions before you go. Make sure you wear a face cover to prevent exposing other people to the virus. Where can you get the latest information? The following health organizations are tracking and studying this virus. Their websites contain the most up-to-date information. Brina Konrad also learn what to do if you think you may have been exposed to the virus. · [place] Centers for Disease Control and Prevention (CDC): The CDC provides updated news about the disease and travel advice. The website also tells you how to prevent the spread of infection. www.cdc.gov  · World Health Organization [facility]): WHO offers information about the virus outbreaks. WHO also has travel advice. www.who.int  Current as of: July 10, 2020               Content Version: 12.6  © 2111-6168 [employer]. Care instructions adapted under license by [employer] (which disclaims liability or warranty for this information). If you have questions about a medical condition or this instruction, always ask your healthcare professional. [place] [address] any warranty or liability for your use of this information. Patient Education        Shortness of Breath: Care Instructions  Your Care Instructions     Shortness of breath has many causes.  Sometimes conditions such as anxiety can lead to shortness of breath. Some people get mild shortness of breath when they exercise. Trouble breathing also can be a symptom of a serious problem, such as asthma, lung disease, emphysema, heart problems, and pneumonia. If your shortness of breath continues, you may need tests and treatment. Watch for any changes in your breathing and other symptoms. Follow-up care is a key part of your treatment and safety. Be sure to make and go to all appointments, and call your doctor if you are having problems. It's also a good idea to know your test results and keep a list of the medicines you take. How can you care for yourself at home? · Do not smoke or allow others to smoke around you. If you need help quitting, talk to your doctor about stop-smoking programs and medicines. These can increase your chances of quitting for good. · Get plenty of rest and sleep. · Take your medicines exactly as prescribed. Call your doctor if you think you are having a problem with your medicine. · Find healthy ways to deal with stress. ? Exercise daily. ? Get plenty of sleep. ? Eat regularly and well. When should you call for help? Call 911 anytime you think you may need emergency care. For example, call if:    · You have severe shortness of breath.     · You have symptoms of a heart attack. These may include:  ? Chest pain or pressure, or a strange feeling in the chest.  ? Sweating. ? Shortness of breath. ? Nausea or vomiting. ? Pain, pressure, or a strange feeling in the back, neck, jaw, or upper belly or in one or both shoulders or arms. ? Lightheadedness or sudden weakness. ? A fast or irregular heartbeat. After you call 911, the  may tell you to chew 1 adult-strength or 2 to 4 low-dose aspirin. Wait for an ambulance. Do not try to drive yourself. Call your doctor now or seek immediate medical care if:    · Your shortness of breath gets worse or you start to wheeze.  Wheezing is a high-pitched sound when you breathe.     · You wake up at night out of breath or have to prop your head up on several pillows to breathe.     · You are short of breath after only light activity or while at rest.   Watch closely for changes in your health, and be sure to contact your doctor if:    · You do not get better over the next 1 to 2 days. Where can you learn more? Go to http://www.gray.com/  Enter S780 in the search box to learn more about \"Shortness of Breath: Care Instructions. \"  Current as of: February 24, 2020               Content Version: 12.6  © 2006-2020 [employer]. Care instructions adapted under license by [employer] (which disclaims liability or warranty for this information). If you have questions about a medical condition or this instruction, always ask your healthcare professional. [address] any warranty or liability for your use of this information. Patient Education        Viral Infections: Care Instructions  Your Care Instructions     You don't feel well, but it's not clear what's causing it. You may have a viral infection. Viruses cause many illnesses, such as the common cold, influenza, fever, rashes, and the diarrhea, nausea, and vomiting that are often called \"stomach flu. \" You may wonder if antibiotic medicines could make you feel better. But antibiotics only treat infections caused by bacteria. They don't work on viruses. The good news is that viral infections usually aren't serious. Most will go away in a few days without medical treatment. In the meantime, there are a few things you can do to make yourself more comfortable. Follow-up care is a key part of your treatment and safety. Be sure to make and go to all appointments, and call your doctor if you are having problems. It's also a good idea to know your test results and keep a list of the medicines you take.   How can you care for yourself at home? · Get plenty of rest if you feel tired. · Take an over-the-counter pain medicine if needed, such as acetaminophen (Tylenol), ibuprofen (Advil, Motrin), or naproxen (Aleve). Read and follow all instructions on the label. · Be careful when taking over-the-counter cold or flu medicines and Tylenol at the same time. Many of these medicines have acetaminophen, which is Tylenol. Read the labels to make sure that you are not taking more than the recommended dose. Too much acetaminophen (Tylenol) can be harmful. · Drink plenty of fluids, enough so that your urine is light yellow or clear like water. If you have kidney, heart, or liver disease and have to limit fluids, talk with your doctor before you increase the amount of fluids you drink. · Stay home from work, school, and other public places while you have a fever. When should you call for help? Call 911 anytime you think you may need emergency care. For example, call if:    · You have severe trouble breathing.     · You passed out (lost consciousness). Call your doctor now or seek immediate medical care if:    · You seem to be getting much sicker.     · You have a new or higher fever.     · You have blood in your stools.     · You have new belly pain, or your pain gets worse.     · You have a new rash. Watch closely for changes in your health, and be sure to contact your doctor if:    · You start to get better and then get worse.     · You do not get better as expected. Where can you learn more? Go to http://www.gray.com/  Enter L906 in the search box to learn more about \"Viral Infections: Care Instructions. \"  Current as of: February 11, 2020               Content Version: 12.6  © 6271-9551 [employer]. Care instructions adapted under license by [employer] (which disclaims liability or warranty for this information).  If you have questions about a medical condition or this instruction, always ask your healthcare professional. [place] [address] any warranty or liability for your use of this information.

## 2020-10-26 PROBLEM — Z86.16 HISTORY OF 2019 NOVEL CORONAVIRUS DISEASE (COVID-19): Status: ACTIVE | Noted: 2020-10-26

## 2020-11-13 ENCOUNTER — HOSPITAL ENCOUNTER (OUTPATIENT)
Dept: PHYSICAL THERAPY | Age: 25
Discharge: HOME | End: 2020-11-13
Attending: FAMILY MEDICINE
Payer: COMMERCIAL

## 2020-11-13 DIAGNOSIS — M54.6 ACUTE BILATERAL THORACIC BACK PAIN: ICD-10-CM

## 2020-11-13 PROCEDURE — 97140 MANUAL THERAPY 1/> REGIONS: CPT

## 2020-11-13 PROCEDURE — 97161 PT EVAL LOW COMPLEX 20 MIN: CPT

## 2020-11-13 NOTE — THERAPY EVALUATION
Kyler Ngo : 1995 Payor: [address] / Plan: 2900 [facility] 30 [facility] / Product Type: Commerical /  [address] [place]  at [facility] 217 [facility], [address] [place] [address] [address] [facility] [phone]   [phone] OUTPATIENT PHYSICAL THERAPY:Initial Assessment 2020 ICD-10: Treatment Diagnosis: Low back pain (M54.5) Difficulty in walking, not elsewhere classified (R26.2) Muscle weakness (generalized) (M62.81) PRECAUTIONS/ALLERGIES:  
Patient has no known allergies. FALL RISK SCORE: 0 (? 5 = High Risk) MD ORDERS: Eval and Treat  MEDICAL/REFERRING DIAGNOSIS: 
Pain in thoracic spine [M54.6] COVID-19 [U07.1] Dyspnea, unspecified [R06.00] Other malaise [R53.81] DATE OF ONSET: 2020 REFERRING PHYSICIAN: Eugenio Swartz* RETURN PHYSICIAN APPOINTMENT: TBD by patient Ambulatory/Rehab Services H2 Model Falls Risk Assessment Risk Factors: 
     No Risk Factors Identified Ability to Rise from Chair: 
     (0)  Ability to rise in a single movement Falls Prevention Plan: No modifications necessary Total: (5 or greater = High Risk): 0  
  [facility] States Patent #[id number]. Federal Law prohibits the replication, distribution or use without written permission from [facility] [employer] INITIAL ASSESSMENT:  Ms. Kyler Ngo has attended 1 physical therapy session including initial evaluation as of 2020. Media Finical exhibits decreased lumbar AROM, increased pain, decreased postural and core strength, decreased functional tolerance, and decreased general LE strength. Pt low back pain began when she had covid-19 in September and states it has lingered. Focal and re-creatable pain along B upper lumbar paraspinals.  Symptoms are non radiating and tests negate neurodynamic tension and disc involvement. Melly Baez will benefit from skilled PT (medically necessary) to address above deficits affecting participation in basic ADLs and overall functional tolerance. Manual techniques (stretching, joint mobilizations, soft tissue mobilization/myofascial release), postural exercises/education, therapeutic techniques/activities, and HEP will be performed as appropriate addressing Brenda Rivas's current condition. PROBLEM LIST (Impacting functional limitations): 1. Decreased Strength 2. Decreased ADL/Functional Activities 3. Decreased Transfer Abilities 4. Decreased Ambulation Ability/Technique 5. Decreased Balance 6. Increased Pain 7. Decreased Activity Tolerance 8. Increased Fatigue 9. Increased Shortness of Breath 10. Decreased Flexibility/Joint Mobility 11. Decreased [place] with Home Exercise Program INTERVENTIONS PLANNED: 
1. Balance Exercise 2. Bed Mobility 3. Cold 4. Cryotherapy 5. Electrical Stimulation 6. Family Education 7. Gait Training 8. Heat 9. Home Exercise Program (HEP) 10. Manual Therapy 11. Neuromuscular Re-education/Strengthening 12. Range of Motion (ROM) 13. Therapeutic Activites 14. Therapeutic Exercise/Strengthening 15. Transfer Training 16. Mechanical traction 17. Aquatic Therapy 18. Electrical Stimulation TREATMENT PLAN: 
Effective Dates: 11/13/2020 TO 2/11/2021 (90 days). Frequency/Duration: 2 times a week for 90 Days GOALS: (Goals have been discussed and agreed upon with patient.) Short Term Goals 4 weeks 1. Melly Baez will be independent with HEP to promote self-management of symptoms. 2. Melly Baez will participate in LE stretching program to increase hamstring flexibility for facilitation of ADL performance. 3. Melly Baez will participate in core stabilization exercises to help with stabilization and improve posture during ADLs to help prevent future injuries.  
4. Tod Belts Bennett March will participate in LE strengthening program with weights as appropriate to help with gait and elevations. 5. Sergio Silva will participate in static and dynamic balance activities to decrease the risk for falls and improve overall QOL. 6. Sergio Silva will tolerate manual therapy/joint mobilizations/soft tissue to increase ROM and decrease pain to improve functional mobility during ADLs. Long Term Goals 12 weeks 1. Sergio Silva will demonstrate an 10 point improvement on the Oswestry to show improvement in function. 2. Sergio Silva will report <=2/10 pain at rest and during ADLs to improve QOL. 3. Sergio Silva will demonstrate >=5/5 LE strength on manual muscle testing to improve performing ADLs and work related activities. 4. Sergio Silva will increase lumbar flexion by 10 degrees to demonstrate improvements in functional mobility. 5. Sergio Silva will be able to demonstrate safe lifting and transfer mechanics without cueing for improved safety with home, childcare, and community activities. Rehabilitation Potential For Stated Goals: GOOD Regarding Brenda Yusuferynchad's therapy, I certify that the treatment plan above will be carried out by a therapist or under their direction. Thank you for this referral, 
Iram Philip, PT, DPT Referring Physician Signature: Adri Skip*              Date HISTORY:  
PATIENT GOAL FOR PHYSICAL THERAPY: 
Pt states she would like to decrease the pain in her lower back, especially the R side, to return to working and performing ADLs with decreased discomfort. HISTORY OF PRESENT INJURY/ILLNESS (REASON FOR REFERRAL): 
Pt states she had covid-19 in September and had significant MSK symptoms. States her lower back hurt her the most and has lingered since then. States the pain began in her thoracic region and down to her sacrum.  Notes her thoracic musculature has improved however continues to have pain along lumbar paraspinals especially her Rt side. Notes she returned to work about 4 weeks ago which slightly helped. States the heating pad helps reduce her symptoms. Notes she took Flexeril at one point and prednisone however symptoms returned once she discontinued use. Denies numbness and tingling. States she gets very fatigued now with minimal exertion and experiences exertional tachycardia. Reports she was very active and able to go to [facility] daily however now cannot perform any kind of physical activity. Note: Patient denies any increase of symptoms with cough, sneeze or valsalva. Patient denies any saddle paresthesia or bowel/bladder deficits. PAIN AND NATURE OF CONDITION Date:  
11/13/2020 Date:  
Highest pain level 9/10 Lowest pain level 1/10 Aggravating factors Standing, Walking, Lifting, Bending, Twisting and Laying Alleviating factors/positions/motions Resting, moist heat Behavior of condition acute Irritability moderate Depression, fear, anxiety No   
 
EZEKIEL Covid-19 inflammatory related Leg pain No   
Sedentary lifestyle No   
 
PAST MEDICAL HISTORY/COMORBIDITIES:  
Ms. Dorita Villagomez  has a past medical history of Urinary tract infection, site not specified and Vesicoureteral reflux, unspecified or without reflux nephropathy (1/7/2014). She also has no past medical history of Difficult intubation, Malignant hyperthermia due to anesthesia, Nausea & vomiting, Pseudocholinesterase deficiency, or Unspecified adverse effect of anesthesia. Ms. Dorita Villagomez  has a past surgical history that includes hx urological. 
 
SOCIAL HISTORY/LIVING ENVIRONMENT:  
 Pt lives at home with her parents since having covid-19. Social History Socioeconomic History  Marital status: SINGLE Spouse name: Not on file  Number of children: Not on file  Years of education: Not on file  Highest education level: Not on file Occupational History  Not on file Social Needs  Financial resource strain: Not on file  Food insecurity Worry: Not on file Inability: Not on file  Transportation needs Medical: Not on file Non-medical: Not on file Tobacco Use  Smoking status: Never Smoker  Smokeless tobacco: Never Used Substance and Sexual Activity  Alcohol use: No  
 Drug use: No  
 Sexual activity: Not Currently Lifestyle  Physical activity Days per week: Not on file Minutes per session: Not on file  Stress: Not on file Relationships  Social connections Talks on phone: Not on file Gets together: Not on file Attends Druze service: Not on file Active member of club or organization: Not on file Attends meetings of clubs or organizations: Not on file Relationship status: Not on file  Intimate partner violence Fear of current or ex partner: Not on file Emotionally abused: Not on file Physically abused: Not on file Forced sexual activity: Not on file Other Topics Concern  Not on file Social History Narrative  Not on file LIFESTYLE Date: 11/13/2020 Occupation: ICU Nurse at [employer] Vigorous Activity: no  
Expose individual to vibrations no Unpleasant work environment no PRIOR LEVEL OF FUNCTION/WORK/ACTIVITY: 
 -Pt very active previously and independent with all ADLs. Active Ambulatory Problems Diagnosis Date Noted  Vesicoureteral reflux, unspecified or without reflux nephropathy 01/07/2014  Allergic rhinitis, cause unspecified 02/27/2015  Iron deficiency anemia 03/02/2015  Lower abdominal pain 05/12/2015  Diarrhea 05/12/2015  Reflux 05/12/2015  Palpitation 05/12/2015  KFIDXEFQ(392.3) 05/12/2015  History of 2019 novel coronavirus disease (COVID-19) 10/26/2020 Resolved Ambulatory Problems Diagnosis Date Noted  No Resolved Ambulatory Problems Past Medical History:  
Diagnosis Date  Urinary tract infection, site not specified CURRENT MEDICATIONS:   
Current Outpatient Medications:  
  dilTIAZem ER (CARDIZEM CD) 120 mg capsule, Take 1 Cap by mouth daily. , Disp: 30 Cap, Rfl: 0 
  albuterol (PROVENTIL HFA, VENTOLIN HFA, PROAIR HFA) 90 mcg/actuation inhaler, TAKE 2 PUFF(S) (INHALATION) EVERY 4 HOURS AS NEEDED   WHEEZING FOR 30 DAYS, Disp: , Rfl:   
 
Date Last Reviewed:  11/13/2020 Number of Personal Factors/Comorbidities that affect the Plan of Care: 0: LOW COMPLEXITY EXAMINATION:  
  
-Pt sits with forward head and rounded shoulders which indicate tight anterior chest musculature, upper trapezius, and levator scapula and weak posterior scapula musculature and deep cervical flexors. Pt displays decreased core motor control indicating weak core and low back musculature. OBSERVATIONS and FUNCTIONAL MOBILITY Date:  
11/13/2020 Date:  
Transfers Independent Posture Forward head and rounded shoulers Gait deviations Decreased Arm Swing Assistive device -Type: None 
-Hand Use: N/A Stairs -Stairs: Reciprocal Pattern 
-Railing: none Bed mobility Independent Muscle atrophy No   
 
 
BALANCE Date: 11/13/2020 Date:   
Right 35s Left 30s PELVIC COMPONENT Date:  
11/13/2020 Date:  
Standing Tian Level Standing PSIS Left Depressed Standing Sacral Sulci Good mobility B Supine-to-Sit leg length NT Seated Tian Level Seated PSIS Level AROM/PROM Joint: Date: 11/13/2020  Date:  Date: Active LE ROM Right Left Right Left Right Left Hip Flexion [facility] Hip Extension [facility] Hip ER [facility] Hip IR [facility] Knee Extension [facility] Knee Flexion [facility] Knee Extension [facility] Lumbar Flexion 40 degrees Pain  --      
Lumbar Extension 30 degrees Pain --      
Lumbar Side-Bending 25 degrees 25 degrees Lumbar Rotation 25% limited Pain in lower back 25% limited STRENGTH Joint: Date: 11/13/2020  Date:  Date:   
 Right Left Right Left Right Left Hip Abduction 4/5 4+/5 Hip Adduction 4/5 4+/5 Hip IR 4/5 4+/5 Hip ER 4/5 4+/5 Hip Flexion 4/5 4+/5 Knee Extension 4+/5 4+/5 Knee Flexion 4+/5 4+/5 Ankle DF 4+/5 4+/5 Ankle PF 4+/5 4+/5 Ankle IV 4+/5 4+/5 Ankle EV 4+/5 4+/5 PALPATION Date:  
11/13/2020 Date:  
TTP B lumbar and thoracic parapsinals TONE R upper lumbar paraspinals and lower thoracic paraspinals PA GLIDES Painful L1-3 Good mobility EDEMA No   
 
 
SPECIAL TESTS: Assessed @ Initial Visit Ja's SignNegative B, Slump: Negative B, SLR: NEgative B, Passive Vertebral Mobility: Good mobility. Painful L1-L3, Sacral Spring: No pain. , Scour: Negative B and ANABELLE: Positive R 
 -SI POST. GAPPING: Positive R 
 -LUMBAR STENOSIS: No 
    -Bilateral symptoms:  
    -Leg pain more than back pain:  
    -Pain during walking/standing:  
    -Pain relief upon sitting:  
    -Age greater than 48 years: NEUROLOGICAL SCREEN: Assessed @ Initial Visit  
 -RADIATING SYMPTOMS: No 
 
 -DERMATOMES: Normal and equal B 
 
-MYOTOMES Date: 11/13/2020 Right Left L2 & L3 (Hip Flexors) 4/5 5/5 L3-L4 
(Knee Extensors) 5/5 5/5  
L4 (Ankle DFs) 5/5 5/5 L5 (Hallux Ext) 5/5 5/5 L5-S1 (Ankle PFs) 5/5 5/5 S1-S2 (Ankle EVs) 5/5 5/5  
 
-REFLEXES: Date: 11/13/2020 Right Left L4 
(Quadriceps) 2+ 2+  
S1 (Achilles) 2+ 2+ FINDINGS  Date: 11/13/2020 Primary Disc Flattened Back Yes Radiographic Instability Excessive Lordosis No  
SI Joint Dysfunction Flattened Back Yes Secondary Disc (DJD) Hypertrophic Supraspinous Ligament: Thickening along spinous process. No  
Spine Stenosis Flattened Back Yes Facet Impingement Flexed back, usually unilateral No  
Nerve Root Adhesion Bad posture, avoid forward flexion, stand with knees bent No  
 
RED FLAGS: Date: 11/13/2020 Non-Mechanical pain distribution (cannot be produced, changed, or reduced during exam): NO  
Cauda Equina Dysfunction: NO Upper lumbar disc herniation in younger patients (femoral nerve tension test for lateral disc herniation in lower lumbar): NO  
Lumbar compression fracture (age > 48, trauma, corticosteroid use NO Spine Cancer (age > 48, pervious history of cancer, failure to improve in 1 month of therapy, no relief - be rest, duration > 1 month, unexplained weight loss, insidious onset, constitutional symptoms): NO Ankylosing Spondylitis (age < 36, pain not relieved by supine, morning back stiffness, pain duration > 3 months, improved by exercise): NO Sacral Fracture: NO Body Structures Involved: 1. Bones 2. Joints 3. Muscles 4. Ligaments Body Functions Affected: 1. Sensory/Pain 2. Neuromusculoskeletal 
3. Movement Related Activities and Participation Affected: 1. Mobility 2. Self Care 3. Domestic Life 4. Interpersonal Interactions and Relationships 5. Community, Social and [place] Number of elements that affect the Plan of Care: 4+: HIGH COMPLEXITY CLINICAL PRESENTATION:  
Presentation: Stable and uncomplicated: LOW COMPLEXITY CLINICAL DECISION MAKING:  
OUTCOME MEASURE USED:  
Tool Used: Tool Used: Modified Oswestry Low Back Pain Questionnaire Score:  Initial: 13/50  Most Recent: X/50 (Date: -- ) Interpretation of Score: Each section is scored on a 0-5 scale, 5 representing the greatest disability. The scores of each section are added together for a total score of 50. Payor: [address] / Plan: [facility] MEDICAL [address] / Product Type: Commerical /  
 
MEDICAL NECESSITY: 
· Skilled intervention continues to be required due to above deficits affecting participation in basic ADLs and overall functional tolerance.  
 
REASON FOR SERVICES/OTHER COMMENTS: 
· Patient continues to require skilled intervention due to  above deficits affecting participation in basic ADLs and overall functional tolerance. Use of outcome tool(s) and clinical judgement create a POC that gives a: Clear prediction of patient's progress: LOW COMPLEXITY  
  
 
TREATMENT/SESSION ASSESSMENT:  Jarret Santiago verbalized understanding of role of PT and POC. · Pain/ Symptoms: Initial:   4/10 Post Session:  3/10 · Compliance with Program/Exercises: Will assess as treatment progresses. · Recommendations/Intent for next treatment session: \"Next visit will focus on advancements to more challenging activities\". Total Treatment Duration: PT Patient Time In/Time Out Time In: 0930 Time Out: 1015 Katiuska Dick, PT, DPT, COMT Visit Approval Visit # Therapist initials Date A NS Cx Comments 1 JM 11/13/2020 [x]  [] [] Initial evaluation  
    [] [] []   

## 2020-11-13 NOTE — PROGRESS NOTES
Jennifer Mohan  : 1995  Payor: [facility] / Plan: [address] / Product Type: Commerical /  [address] [place]  at [facility]  [address] [facility], [place] [address] [address] [facility]  [phone]   [phone]      OUTPATIENT PHYSICAL THERAPY: Daily Treatment Note 2020  Visit Count:  1    ICD-10: Treatment Diagnosis:  Low back pain (M54.5)  Difficulty in walking, not elsewhere classified (R26.2)  Muscle weakness (generalized) (M62.81)        TREATMENT PLAN:  Effective Dates: 2020 TO 2021 (90 days).    Frequency/Duration: 2 times a week for 90 Days       PRE-TREATMENT SYMPTOMS/COMPLAINTS:  See eval    MEDICATIONS REVIEWED:  2020   TREATMENT:   (In addition to Assessment/Re-Assessment sessions the following treatments were rendered)    THERAPEUTIC EXERCISE: (0 minutes):  Exercises per grid below to improve mobility, strength and balance. Required minimal visual and verbal cues to promote proper body alignment and promote proper body posture. Progressed resistance, range and complexity of movement as indicated. Date:  2020 Date:   Date:     Activity/Exercise Parameters Parameters Parameters                                               MANUAL THERAPY: (8 minutes): Joint mobilization, Soft tissue mobilization and Manipulation was utilized and necessary because of the patient's restricted joint motion, painful spasm, loss of articular motion and restricted motion of soft tissue. STM to B thoracic and lumbar paraspinals. Grade 1-2 PA glides to lumbar segments. (Used abbreviations: MET - muscle energy technique; PNF - proprioceptive neuromuscular facilitation; NMR - neuromuscular re-education; AP - anterior to posterior; PA - posterior to anterior)    MODALITIES: (0 minutes):      Not today      TREATMENT/SESSION ASSESSMENT:  Jennifer Mohan verbalized understanding of role of PT and POC.  Pt with tone to R lumbar paraspinals which subsided slightly with manual therapy. Pt reported feeling less still following treatment. Lateral lumbar flexion improved 10 degrees B. RECOMMENDATIONS/INTENT FOR NEXT TREATMENT SESSION: \"Next visit will focus on advancements to more challenging activities\".     PAIN: Initial: 4/10 Post Session:  3/10     [employer] Portal    Total Treatment Billable Duration: 8min  PT Patient Time In/Time Out  Time In: 0930  Time Out: 1015  Jacinta Upton, PT, DPT, COMT    Future Appointments   Date Time Provider Jayne Karis   11/17/2020  8:00 AM Jessica Cantrell PT, DPT [facility]   11/30/2020  9:40 AM Cristina Elizabeth MD [facility] PRE PRE   [facility]2020 [facility]   12/22/2020  9:15 AM Eric Fuentes MD [facility]     Visit Approval Visit # Therapist initials Date A NS Cx Comments    1 JM 11/13/2020 [x]  [] [] Initial evaluation       [] [] []        [] [] []        [] [] []        [] [] []        [] [] []        [] [] []        [] [] []        [] [] []        [] [] []        [] [] []        [] [] []        [] [] []        [] [] []        [] [] []        [] [] []        [] [] []        [] [] []

## 2020-11-17 ENCOUNTER — HOSPITAL ENCOUNTER (OUTPATIENT)
Dept: PHYSICAL THERAPY | Age: 25
Discharge: HOME | End: 2020-11-17
Attending: FAMILY MEDICINE
Payer: COMMERCIAL

## 2020-11-17 PROCEDURE — 97140 MANUAL THERAPY 1/> REGIONS: CPT

## 2020-11-17 PROCEDURE — 97110 THERAPEUTIC EXERCISES: CPT

## 2020-11-17 NOTE — PROGRESS NOTES
Estefania Hendricks  : 1995  Payor: [facility] / Plan: [address] / Product Type: Commerical /  [address] [place]  at [facility]  [address], [address] [facility], [address]  [phone]   [phone]      OUTPATIENT PHYSICAL THERAPY: Daily Treatment Note 2020  Visit Count:  2    ICD-10: Treatment Diagnosis:  Low back pain (M54.5)  Difficulty in walking, not elsewhere classified (R26.2)  Muscle weakness (generalized) (M62.81)        TREATMENT PLAN:  Effective Dates: 2020 TO 2021 (90 days).    Frequency/Duration: 2 times a week for 90 Days       PRE-TREATMENT SYMPTOMS/COMPLAINTS:  Pt states she felt great after last treatment session and had the best weekend, pain wise, that she has had in months. MEDICATIONS REVIEWED:  2020   TREATMENT:   (In addition to Assessment/Re-Assessment sessions the following treatments were rendered)    THERAPEUTIC EXERCISE: (11min minutes):  Exercises per grid below to improve mobility, strength and balance. Required minimal visual and verbal cues to promote proper body alignment and promote proper body posture. Progressed resistance, range and complexity of movement as indicated. Date:  2020 Date:   Date:     Activity/Exercise Parameters Parameters Parameters   Airdyne 8min     QL doorway stretch 3x30s B                                     MANUAL THERAPY: (30 minutes): Joint mobilization, Soft tissue mobilization and Manipulation was utilized and necessary because of the patient's restricted joint motion, painful spasm, loss of articular motion and restricted motion of soft tissue. STM to B thoracic and lumbar paraspinals. Grade 1-2 PA glides to lumbar segments. Trigger point release to Lt QL and Rt lumbar paraspinals  Boomstick to B QL, lumbar paraspinals, glute med, piriformis, glute max.   Lumbar roll manipulation B  CT junction lift off manipulation in sitting    (Used abbreviations: MET - muscle energy technique; PNF - proprioceptive neuromuscular facilitation; NMR - neuromuscular re-education; AP - anterior to posterior; PA - posterior to anterior)    MODALITIES: (0 minutes):      Not today      TREATMENT/SESSION ASSESSMENT:  Mancil Coral with palpable tone to Rt lumbar paraspinals and Lt QL. Pt very tender to palpation which subsided following manual therapy. Multiple cavitations during Rt lumbar roll and CT junction manipulation. Pt struggles with posture and requires frequent verbal cues to correct. Immediate improvement in B lumbar rotation in standing following manipulation. Continue working on slowly building exercise tolerance since she currently fatigues quickly due to post covid long hauler symptoms. RECOMMENDATIONS/INTENT FOR NEXT TREATMENT SESSION: \"Next visit will focus on advancements to more challenging activities\".     PAIN: Initial: 4/10 Post Session:  0/10     [employer] Portal    Total Treatment Billable Duration: 41min  PT Patient Time In/Time Out  Time In: 0800  Time Out: 0845  Mary Tam, PT, DPT, COMT    Future Appointments   Date Time Provider Jayne Dyson   12/3/2020  9:00 AM Molly Lopez MD [facility] PRE PRE   12/17/2020  8:30 AM ECHO 36 [facility]   12/22/2020  9:15 AM Ravi Smith MD [facility]     Visit Approval Visit # Therapist initials Date A NS Cx Comments    1  11/13/2020 [x]  [] [] Initial evaluation    2  11/17/2020 [x] [] []        [] [] []        [] [] []        [] [] []        [] [] []        [] [] []        [] [] []        [] [] []        [] [] []        [] [] []        [] [] []        [] [] []        [] [] []        [] [] []        [] [] []        [] [] []        [] [] []

## 2020-11-24 ENCOUNTER — HOSPITAL ENCOUNTER (OUTPATIENT)
Dept: PHYSICAL THERAPY | Age: 25
Discharge: HOME | End: 2020-11-24
Attending: FAMILY MEDICINE
Payer: COMMERCIAL

## 2020-11-24 PROCEDURE — 97140 MANUAL THERAPY 1/> REGIONS: CPT

## 2020-11-24 PROCEDURE — 97110 THERAPEUTIC EXERCISES: CPT

## 2020-11-24 NOTE — PROGRESS NOTES
Giselle Pena  : 1995  Payor: [facility] / Plan: [address] / Product Type: Commerical /  [address] [place]  at [facility]  [address], [address], [place] [address] [address] [facility]  [phone]   [phone]      OUTPATIENT PHYSICAL THERAPY: Daily Treatment Note 2020  Visit Count:  3    ICD-10: Treatment Diagnosis:  Low back pain (M54.5)  Difficulty in walking, not elsewhere classified (R26.2)  Muscle weakness (generalized) (M62.81)        TREATMENT PLAN:  Effective Dates: 2020 TO 2021 (90 days).    Frequency/Duration: 2 times a week for 90 Days       PRE-TREATMENT SYMPTOMS/COMPLAINTS:  Pt states she worked a 10-12 hour shift since the last time she saw me which has increased her Rt lower back pain. Notes she has not been able to increase her stamina and continues to have increased levels of fatigue with minimal exertion. MEDICATIONS REVIEWED:  2020   TREATMENT:   (In addition to Assessment/Re-Assessment sessions the following treatments were rendered)    THERAPEUTIC EXERCISE: (11min minutes):  Exercises per grid below to improve mobility, strength and balance. Required minimal visual and verbal cues to promote proper body alignment and promote proper body posture. Progressed resistance, range and complexity of movement as indicated. Date:  2020 Date:  2020 Date:     Activity/Exercise Parameters Parameters Parameters   Airdyne 8min 10min    QL doorway stretch 3x30s B     DKTC stretch  3x30s                              MANUAL THERAPY: (30 minutes): Joint mobilization, Soft tissue mobilization and Manipulation was utilized and necessary because of the patient's restricted joint motion, painful spasm, loss of articular motion and restricted motion of soft tissue. STM to B thoracic and lumbar paraspinals. Grade 1-2 PA glides to lumbar segments.    Trigger point release to Lt QL and Rt lumbar paraspinals  Boomstick to B QL, lumbar paraspinals, glute med, piriformis, glute max. Lumbar roll manipulation B-NOT TODAY 11/24/2020  CT junction lift off manipulation in sitting-NOT TODAY 11/24/2020    (Used abbreviations: MET - muscle energy technique; PNF - proprioceptive neuromuscular facilitation; NMR - neuromuscular re-education; AP - anterior to posterior; PA - posterior to anterior)    MODALITIES: (0 minutes):      Not today      TREATMENT/SESSION ASSESSMENT:  Harl Given with palpable tone to Lt QL. The tenderness along her Rt paraspinals had reduced and she was no longer as tender. Trigger point release helped discomfort in Lt QL. Pt advised to begin walking program to increase her stamina and set small goals. She reported relief during DKTC stretch. Continue working on slowly building exercise tolerance since she currently fatigues quickly due to post covid long hauler symptoms. RECOMMENDATIONS/INTENT FOR NEXT TREATMENT SESSION: \"Next visit will focus on advancements to more challenging activities\".     PAIN: Initial: 4/10 Post Session:  1/10     [facility] Portal    Total Treatment Billable Duration: 41min  PT Patient Time In/Time Out  Time In: 0930  Time Out: 1015  Jaylin Barrios, PT, DPT, COMT    Future Appointments   Date Time Provider Jayne Dyson   12/3/2020  9:00 AM Nilo Larkin MD [facility] PRE PRE   12/3/2020 11:00 AM Deyanira Baez PT, DPT [facility]   12/10/2020 10:15 AM Deyanira Baez PT, DPT [facility]   12/17/2020  8:30 AM ECHO [facility] UCD   12/17/2020 11:00 AM Deyanira Baez PT, DPT [facility]   12/22/2020  9:15 AM Asad Parra MD [facility]     Visit Approval Visit # Therapist initials Date A NS Cx Comments    1  11/13/2020 [x]  [] [] Initial evaluation    2  11/17/2020 [x] [] []     3  11/24/2020 [x] [] []        [] [] []        [] [] []        [] [] []        [] [] []        [] [] []        [] [] []        [] [] []        [] [] [] [] [] []        [] [] []        [] [] []        [] [] []        [] [] []        [] [] []        [] [] []

## 2020-11-25 ENCOUNTER — APPOINTMENT (OUTPATIENT)
Dept: PHYSICAL THERAPY | Age: 25
End: 2020-11-25
Attending: FAMILY MEDICINE
Payer: COMMERCIAL

## 2020-12-03 ENCOUNTER — HOSPITAL ENCOUNTER (OUTPATIENT)
Dept: PHYSICAL THERAPY | Age: 25
Discharge: HOME | End: 2020-12-03
Attending: FAMILY MEDICINE
Payer: COMMERCIAL

## 2020-12-03 PROCEDURE — 97110 THERAPEUTIC EXERCISES: CPT

## 2020-12-03 NOTE — PROGRESS NOTES
Kiana Nazario  : 1995  Payor: [facility] / Plan: [address] / Product Type: Commerical /  [address]  at [facility]  [address], [address] [facility], [place] [address] [address] [facility]  [phone]   [phone]      OUTPATIENT PHYSICAL THERAPY: Daily Treatment Note 12/3/2020  Visit Count:  4    ICD-10: Treatment Diagnosis:  Low back pain (M54.5)  Difficulty in walking, not elsewhere classified (R26.2)  Muscle weakness (generalized) (M62.81)        TREATMENT PLAN:  Effective Dates: 2020 TO 2021 (90 days).    Frequency/Duration: 2 times a week for 90 Days       PRE-TREATMENT SYMPTOMS/COMPLAINTS:  Pt states she started her walking program and did 1mi in 15min and 30s. Notes she was very fatigued afterwards. States her lower back pain was increased over the weekend and holiday. Reports the pain is now radiating up her back. MEDICATIONS REVIEWED:  12/3/2020   TREATMENT:   (In addition to Assessment/Re-Assessment sessions the following treatments were rendered)    THERAPEUTIC EXERCISE: (38min minutes):  Exercises per grid below to improve mobility, strength and balance. Required minimal visual and verbal cues to promote proper body alignment and promote proper body posture. Progressed resistance, range and complexity of movement as indicated.      Date:  2020 Date:  2020 Date:  12/3/2020   Activity/Exercise Parameters Parameters Parameters   Airdyne 8min 10min 10min   QL doorway stretch 3x30s B     DKTC stretch  3x30s    Resisted clamshells   2x10 reps B  Red TB   LTR   1x15 reps B  Hold 5s   Glute bridge   1x10 reps  Hold 10s   Bolster squeeze   1x10 reps  Hold 10s   Thoracic extension stretch using bolster      Arm angels on bolster        Gastroc stretch   3x30s   Seated piriformis stretch   3x30s B                 MANUAL THERAPY: (0 minutes): Joint mobilization, Soft tissue mobilization and Manipulation was utilized and necessary because of the patient's restricted joint motion, painful spasm, loss of articular motion and restricted motion of soft tissue. STM to B thoracic and lumbar paraspinals. Grade 1-2 PA glides to lumbar segments. Trigger point release to Lt QL and Rt lumbar paraspinals  Boomstick to B QL, lumbar paraspinals, glute med, piriformis, glute max. Lumbar roll manipulation B-NOT TODAY 12/3/2020  CT junction lift off manipulation in sitting-NOT TODAY 12/3/2020    (Used abbreviations: MET - muscle energy technique; PNF - proprioceptive neuromuscular facilitation; NMR - neuromuscular re-education; AP - anterior to posterior; PA - posterior to anterior)    MODALITIES: (0 minutes):      Not today      TREATMENT/SESSION ASSESSMENT:  Joseline Barney educated on posture and how it contributes to thoracic pain. Palpable pain throughout B thoracic and lumbar paraspinals. Fatigued with exercises and reported muscle burning     RECOMMENDATIONS/INTENT FOR NEXT TREATMENT SESSION: \"Next visit will focus on advancements to more challenging activities\".     PAIN: Initial: 4/10 Post Session:  1/10     [facility] Portal    Total Treatment Billable Duration: 41min  PT Patient Time In/Time Out  Time In: 1100  Time Out: 1145  Carrie Patel PT, DPT, COMT    Future Appointments   Date Time Provider Jayne Dyson   12/10/2020 10:15 AM Maryjane Park PT, VASU [facility]   [facility]2020  [facility]   12/17/2020 11:00 AM Maryjane Park PT, DPT [facility]   12/22/2020  9:15 AM Evita Butler MD [facility]     Visit Approval Visit # Therapist initials Date A NS Cx Comments    1  11/13/2020 [x]  [] [] Initial evaluation    2  11/17/2020 [x] [] []     3  11/24/2020 [x] [] []     4  12/3/2020 [x] [] []        [] [] []        [] [] []        [] [] []        [] [] []        [] [] []        [] [] []        [] [] []        [] [] []        [] [] []        [] [] []        [] [] [] [] [] []        [] [] []        [] [] []

## 2020-12-10 ENCOUNTER — HOSPITAL ENCOUNTER (OUTPATIENT)
Dept: PHYSICAL THERAPY | Age: 25
Discharge: HOME | End: 2020-12-10
Attending: FAMILY MEDICINE
Payer: COMMERCIAL

## 2020-12-10 PROCEDURE — 97110 THERAPEUTIC EXERCISES: CPT

## 2020-12-10 NOTE — PROGRESS NOTES
Terrellsriram Joshua  : 1995  Payor: [facility] / Plan: [address] / Product Type: [employer] /  [address] [place]  at [facility]  Serjio Ahn [facility] [address], [address], [facility] [address] [facility]  [phone]   [phone]      OUTPATIENT PHYSICAL THERAPY: Daily Treatment Note 12/10/2020  Visit Count:  5    ICD-10: Treatment Diagnosis:  Low back pain (M54.5)  Difficulty in walking, not elsewhere classified (R26.2)  Muscle weakness (generalized) (M62.81)        TREATMENT PLAN:  Effective Dates: 2020 TO 2021 (90 days).    Frequency/Duration: 2 times a week for 90 Days       PRE-TREATMENT SYMPTOMS/COMPLAINTS:  Pt states she has been having more chest palpitations in the past 2 days than normal. Notes it is more chest pressure and that she is very aware of her HR. MEDICATIONS REVIEWED:  12/10/2020   TREATMENT:   (In addition to Assessment/Re-Assessment sessions the following treatments were rendered)    THERAPEUTIC EXERCISE: (38min minutes):  Exercises per grid below to improve mobility, strength and balance. Required minimal visual and verbal cues to promote proper body alignment and promote proper body posture. Progressed resistance, range and complexity of movement as indicated.      Date:  2020 Date:  2020 Date:  12/3/2020 Date:  12/10/2020   Activity/Exercise Parameters Parameters Parameters    Airdyne 8min 10min 10min 12min   QL doorway stretch 3x30s B      DKTC stretch  3x30s     Resisted clamshells   2x10 reps B  Red TB    LTR   1x15 reps B  Hold 5s    Glute bridge   1x10 reps  Hold 10s    Bolster squeeze   1x10 reps  Hold 10s    Thoracic extension stretch using bolster       Arm angels on bolster         Gastroc stretch   3x30s    Seated piriformis stretch   3x30s B 3x30s B   Bungee forward    5 laps   Bungee sideways    5 laps   Bungee backwards    5 laps   Single leg RDL    2x10 reps B   Gastroc stretch    3x1min MANUAL THERAPY: (0 minutes): Joint mobilization, Soft tissue mobilization and Manipulation was utilized and necessary because of the patient's restricted joint motion, painful spasm, loss of articular motion and restricted motion of soft tissue. STM to B thoracic and lumbar paraspinals. Grade 1-2 PA glides to lumbar segments. Trigger point release to Lt QL and Rt lumbar paraspinals  Boomstick to B QL, lumbar paraspinals, glute med, piriformis, glute max. Lumbar roll manipulation B-NOT TODAY 12/10/2020  CT junction lift off manipulation in sitting-NOT TODAY 12/10/2020    (Used abbreviations: MET - muscle energy technique; PNF - proprioceptive neuromuscular facilitation; NMR - neuromuscular re-education; AP - anterior to posterior; PA - posterior to anterior)    MODALITIES: (0 minutes):      Not today      TREATMENT/SESSION ASSESSMENT:  Brenda Rivas /78 prior to initiating treatment with a R-HR of 80. She performed exercises slowly but well. Required manual assistance to reduce force when returning to neutral; weakness and decreased balance during lateral bungee exercises. Complained of SOB during bungee exercises and required a standing break. HR checked and was 105. Pt then performed SL RDL exercises in parallel bars but complained of chest pressure and tachycardia afterwards. Pt sat down and vitals taken. /80 and . Pt returned to baseline within minutes with no more complaints of chest discomfort. Noted this was the most activity she had done since she had COVID. She noted her HR elevates very quickly with movements and she has palpitations with sustained elevated HR.    RECOMMENDATIONS/INTENT FOR NEXT TREATMENT SESSION: \"Next visit will focus on advancements to more challenging activities\".     PAIN: Initial: 1/10 Post Session:  1/10     [facility] Portal    Total Treatment Billable Duration: 38min  PT Patient Time In/Time Out  Time In: 1015  Time Out: [address] Jarad Fruit, DPT, COMT    Future Appointments   Date Time Provider Jayne Dyson   12/17/2020  8:30 AM ECHO 36 [facility] UCD   12/17/2020 11:00 AM Trent Bray, PT, DPT [facility]   12/22/2020  9:15 AM Romeo Beltrán MD [facility] UCD     Visit Approval Visit # Therapist initials Date A NS Cx Comments    1  11/13/2020 [x]  [] [] Initial evaluation    2  11/17/2020 [x] [] []     3  11/24/2020 [x] [] []     4  12/3/2020 [x] [] []     5  12/10/2020 [x] [] []        [] [] []        [] [] []        [] [] []        [] [] []        [] [] []        [] [] []        [] [] []        [] [] []        [] [] []        [] [] []        [] [] []        [] [] []        [] [] []

## 2020-12-17 ENCOUNTER — HOSPITAL ENCOUNTER (OUTPATIENT)
Dept: PHYSICAL THERAPY | Age: 25
Discharge: HOME | End: 2020-12-17
Attending: FAMILY MEDICINE
Payer: COMMERCIAL

## 2020-12-17 PROCEDURE — 97110 THERAPEUTIC EXERCISES: CPT

## 2020-12-17 PROCEDURE — 97140 MANUAL THERAPY 1/> REGIONS: CPT

## 2020-12-17 NOTE — PROGRESS NOTES
Estefania Hendricks  : 1995  Payor: [facility] / Plan: [address] [facility] / Product Type: Commerical /  [address] [place]  at [facility]  [address], [address], [place], [facility] [address] [facility]  [phone]   [phone]      OUTPATIENT PHYSICAL THERAPY: Daily Treatment Note 2020  Visit Count:  6    ICD-10: Treatment Diagnosis:  Low back pain (M54.5)  Difficulty in walking, not elsewhere classified (R26.2)  Muscle weakness (generalized) (M62.81)        TREATMENT PLAN:  Effective Dates: 2020 TO 2021 (90 days).    Frequency/Duration: 2 times a week for 90 Days       PRE-TREATMENT SYMPTOMS/COMPLAINTS:  Pt states she was very tired after last session and went home to sleep. Notes she does not have a lot of energy and is now having body pains that do not go away. Reports the pain is from her mid thoracic region down to her sacrum. MEDICATIONS REVIEWED:  2020   TREATMENT:   (In addition to Assessment/Re-Assessment sessions the following treatments were rendered)    THERAPEUTIC EXERCISE: (13min):  Exercises per grid below to improve mobility, strength and balance. Required minimal visual and verbal cues to promote proper body alignment and promote proper body posture. Progressed resistance, range and complexity of movement as indicated.      Date:  2020 Date:  2020 Date:  12/3/2020 Date:  12/10/2020 Date:  2020   Activity/Exercise Parameters Parameters Parameters     Airdyne 8min 10min 10min 12min 10min   QL doorway stretch 3x30s B       DKTC stretch  3x30s      Resisted clamshells   2x10 reps B  Red TB     LTR   1x15 reps B  Hold 5s     Glute bridge   1x10 reps  Hold 10s     Bolster squeeze   1x10 reps  Hold 10s     Thoracic extension stretch using bolster        Arm angels on bolster          Gastroc stretch   3x30s     Seated piriformis stretch   3x30s B 3x30s B    Bungee forward    5 laps    Bungee sideways 5 laps    Bungee backwards    5 laps    Single leg RDL    2x10 reps B    Gastroc stretch    3x1min    Thoracic stretch with core control on wall with physioball     1x10 reps   Hold 5s  Green physioball                             MANUAL THERAPY: (25 minutes): Joint mobilization, Soft tissue mobilization and Manipulation was utilized and necessary because of the patient's restricted joint motion, painful spasm, loss of articular motion and restricted motion of soft tissue. STM to B thoracic and lumbar paraspinals. Grade 1-2 PA glides to lumbar segments. Trigger point release to Lt QL and Rt lumbar paraspinals  Boomstick to B QL, lumbar paraspinals, glute med, piriformis, glute max. Lumbar roll manipulation B-NOT TODAY 12/17/2020  CT junction lift off manipulation in sitting  (Used abbreviations: MET - muscle energy technique; PNF - proprioceptive neuromuscular facilitation; NMR - neuromuscular re-education; AP - anterior to posterior; PA - posterior to anterior)    MODALITIES: (0 minutes):      Not today      TREATMENT/SESSION ASSESSMENT:  Jeannett Gum with increased tone to B lower thoracic paraspinals. Increased tenderness today compared to previous sessions. Lt sacral torsion that neutralized following manual therapy. Pt very tired today and with little energy. RECOMMENDATIONS/INTENT FOR NEXT TREATMENT SESSION: \"Next visit will focus on advancements to more challenging activities\".     PAIN: Initial: 4/10 Post Session:  2/10     [facility] Portal    Total Treatment Billable Duration:38min  PT Patient Time In/Time Out  Time In: 1100  Time Out: 1145  Carolyn Carson, PT, DPT, COMT    Future Appointments   Date Time Provider Jayne Dyson   12/22/2020  9:15 AM Danni Dao MD [facility]   12/22/2020  1:00 PM Saurabh Muhammad PT, DPT [facility]     Visit Approval Visit # Therapist initials Date A NS Cx Comments    1 TATE 11/13/2020 [x]  [] [] Initial evaluation    2 TATE 11/17/2020 [x] [] [] 3  11/24/2020 [x] [] []     4  12/3/2020 [x] [] []     5  12/10/2020 [x] [] []     6  12/17/2020 [x] [] []        [] [] []        [] [] []        [] [] []        [] [] []        [] [] []        [] [] []        [] [] []        [] [] []        [] [] []        [] [] []        [] [] []        [] [] []

## 2020-12-22 ENCOUNTER — HOSPITAL ENCOUNTER (OUTPATIENT)
Dept: PHYSICAL THERAPY | Age: 25
Discharge: HOME | End: 2020-12-22
Attending: FAMILY MEDICINE
Payer: COMMERCIAL

## 2020-12-22 PROCEDURE — 97140 MANUAL THERAPY 1/> REGIONS: CPT

## 2020-12-22 PROCEDURE — 97110 THERAPEUTIC EXERCISES: CPT

## 2020-12-22 NOTE — PROGRESS NOTES
Kalee Castañeda  : 1995  Payor: [facility] / Plan: [address] / Product Type: Commerical /  [address] [place]  at [address] [facility] 63, [address], [place] [address] [address] [facility]  [phone]   [phone]      OUTPATIENT PHYSICAL THERAPY: Daily Treatment Note 2020  Visit Count:  7    ICD-10: Treatment Diagnosis:  Low back pain (M54.5)  Difficulty in walking, not elsewhere classified (R26.2)  Muscle weakness (generalized) (M62.81)        TREATMENT PLAN:  Effective Dates: 2020 TO 2021 (90 days).    Frequency/Duration: 2 times a week for 90 Days       PRE-TREATMENT SYMPTOMS/COMPLAINTS:  Pt states she saw cardiologist today for follow up on her diagnostic testing. Notes she was put on new medication to try and lower her HR. States pain continues in her     MEDICATIONS REVIEWED:  2020   TREATMENT:   (In addition to Assessment/Re-Assessment sessions the following treatments were rendered)    THERAPEUTIC EXERCISE: (30min):  Exercises per grid below to improve mobility, strength and balance. Required minimal visual and verbal cues to promote proper body alignment and promote proper body posture. Progressed resistance, range and complexity of movement as indicated.      Date:  12/3/2020 Date:  12/10/2020 Date:  2020 Date:  2020   Activity/Exercise Parameters      Airdyne 10min 12min 10min 10min   QL doorway stretch       DKTC stretch       Resisted clamshells 2x10 reps B  Red TB   2x10 reps B  Hold 5s  Green TB   LTR 1x15 reps B  Hold 5s      Glute bridge 1x10 reps  Hold 10s   1x10 reps  Hold 10s  Holding yellow weighted ball up   Bolster squeeze 1x10 reps  Hold 10s      Thoracic extension stretch using bolster       Arm angels on bolster         Gastroc stretch 3x30s      Seated piriformis stretch 3x30s B 3x30s B     Bungee forward  5 laps     Bungee sideways  5 laps     Bungee backwards  5 laps     Single leg RDL  2x10 reps B     Gastroc stretch  3x1min     Thoracic stretch with core control on wall with physioball   1x10 reps   Hold 5s  Green physioball    Deadbugs    1x10 reps B   TRX squats    2x10 reps   Child's pose stretch with thoracic component on physioball    1x10 reps  Hold 5s   Cat/cow    1x10 reps ea  Hold 3s   Bird dog    1x10 reps B   Thread the needle in quadruped    1x10 reps B          HEP:  Access Code: [email or web address]   URL: [email or web address]   Date: 12/22/2020   Prepared by: Liz Sawyer   Exercises   Supine Dead Bug with Leg Extension - 10 reps - 2 sets - 2x daily - 7x weekly   Supine Bridge - 10 reps - 2 sets - 5s hold - 2x daily - 7x weekly   Bird Dog - 10 reps - 2 sets - 2x daily - 7x weekly   Cat Cow - 10 reps - 2 sets - 5s hold - 2x daily - 7x weekly   Child's Pose Stretch - 3 sets - 30s hold - 2x daily - 7x weekly   Child's Pose with Sidebending - 10 reps - 2 sets - 2x daily - 7x weekly   Child's Pose with Thread the Needle - 10 reps - 2 sets - 2x daily - 7x weekly   Sidelying Thoracic Lumbar Rotation - 10 reps - 2 sets - 5s hold - 2x daily - 7x weekly     MANUAL THERAPY: (10 minutes): Joint mobilization, Soft tissue mobilization and Manipulation was utilized and necessary because of the patient's restricted joint motion, painful spasm, loss of articular motion and restricted motion of soft tissue. STM with thera roll to B thoracic and lumbar paraspinals. Grade 1-2 PA glides to lumbar segments. Trigger point release to Lt QL and Rt lumbar paraspinals  Boomstick to B QL, lumbar paraspinals, glute med, piriformis, glute max. -NOT TODAY 12/22/2020  Lumbar roll manipulation B-NOT TODAY 12/22/2020  CT junction lift off manipulation in sitting-NOT TODAY 12/22/2020  (Used abbreviations: MET - muscle energy technique; PNF - proprioceptive neuromuscular facilitation; NMR - neuromuscular re-education; AP - anterior to posterior; PA - posterior to anterior)    MODALITIES: (0 minutes):      Not today      TREATMENT/SESSION ASSESSMENT:  Edwynlupis Ladd with increased tone to B lower thoracic paraspinals. Increased tenderness today compared to previous sessions. No sacral torsion noted. Decreased thoracic rotation during thread the needle exercise. Pt educated on importance of continuing mobility exercises and starting graded exercise work. RECOMMENDATIONS/INTENT FOR NEXT TREATMENT SESSION: \"Next visit will focus on advancements to more challenging activities\".     PAIN: Initial: 4/10 Post Session:  2/10     [facility] Portal    Total Treatment Billable Duration: 40min  PT Patient Time In/Time Out  Time In: 1300  Time Out: 1345  Ardell Quant, PT, DPT, COMT    Future Appointments   Date Time Provider Jayne Karis   12/22/2020  1:00 PM Rizwana Weldon, PT, DPT [facility]   12/31/2020  9:00 AM Nayely Self MD [facility] PRE PRE   6/24/2021  8:00 AM Elie Kay MD [facility] UCD     Visit Approval Visit # Therapist initials Date A NS Cx Comments    1  11/13/2020 [x]  [] [] Initial evaluation    2  11/17/2020 [x] [] []     3  11/24/2020 [x] [] []     4  12/3/2020 [x] [] []     5  12/10/2020 [x] [] []     6  12/17/2020 [x] [] []     7  12/22/2020 [x] [] []        [] [] []        [] [] []        [] [] []        [] [] []        [] [] []        [] [] []        [] [] []        [] [] []        [] [] []        [] [] []        [] [] []

## 2021-07-15 PROBLEM — R19.4 CHANGE IN BOWEL HABITS: Status: ACTIVE | Noted: 2021-07-15

## 2021-07-15 PROBLEM — R53.82 CHRONIC FATIGUE: Status: ACTIVE | Noted: 2021-07-15

## 2021-07-23 ENCOUNTER — HOSPITAL ENCOUNTER (OUTPATIENT)
Dept: GENERAL RADIOLOGY | Age: 26
Discharge: HOME | End: 2021-07-23

## 2021-07-23 DIAGNOSIS — R10.84 COLICKY ABDOMINAL PAIN: ICD-10-CM

## 2021-09-09 ENCOUNTER — HOSPITAL ENCOUNTER (OUTPATIENT)
Dept: LAB | Age: 26
Discharge: HOME | End: 2021-09-09

## 2021-09-09 PROCEDURE — 88312 SPECIAL STAINS GROUP 1: CPT

## 2021-09-09 PROCEDURE — 88305 TISSUE EXAM BY PATHOLOGIST: CPT

## 2021-11-08 ENCOUNTER — TRANSCRIBE ORDER (OUTPATIENT)
Dept: SCHEDULING | Age: 26
End: 2021-11-08

## 2021-11-08 DIAGNOSIS — R10.32 LEFT LOWER QUADRANT PAIN: ICD-10-CM

## 2021-11-08 DIAGNOSIS — R10.12 LEFT UPPER QUADRANT PAIN: Primary | ICD-10-CM

## 2021-11-29 PROBLEM — E55.9 VITAMIN D DEFICIENCY: Status: ACTIVE | Noted: 2021-11-29

## 2021-11-29 PROBLEM — G43.019 INTRACTABLE MIGRAINE WITHOUT AURA: Status: ACTIVE | Noted: 2017-01-20

## 2022-03-18 PROBLEM — G43.019 INTRACTABLE MIGRAINE WITHOUT AURA: Status: ACTIVE | Noted: 2017-01-20

## 2022-03-19 PROBLEM — Z86.16 HISTORY OF 2019 NOVEL CORONAVIRUS DISEASE (COVID-19): Status: ACTIVE | Noted: 2020-10-26

## 2022-03-19 PROBLEM — R53.82 CHRONIC FATIGUE: Status: ACTIVE | Noted: 2021-07-15

## 2022-03-19 PROBLEM — R19.4 CHANGE IN BOWEL HABITS: Status: ACTIVE | Noted: 2021-07-15

## 2022-03-20 PROBLEM — E55.9 VITAMIN D DEFICIENCY: Status: ACTIVE | Noted: 2021-11-29

## 2022-05-13 ENCOUNTER — HOSPITAL ENCOUNTER (EMERGENCY)
Age: 27
Discharge: HOME | End: 2022-05-13
Attending: EMERGENCY MEDICINE
Payer: COMMERCIAL

## 2022-05-13 ENCOUNTER — APPOINTMENT (OUTPATIENT)
Dept: ULTRASOUND IMAGING | Age: 27
End: 2022-05-13
Attending: EMERGENCY MEDICINE
Payer: COMMERCIAL

## 2022-05-13 VITALS
HEART RATE: 87 BPM | TEMPERATURE: 98 F | DIASTOLIC BLOOD PRESSURE: 57 MMHG | RESPIRATION RATE: 16 BRPM | OXYGEN SATURATION: 97 % | SYSTOLIC BLOOD PRESSURE: 114 MMHG

## 2022-05-13 DIAGNOSIS — O20.0 THREATENED MISCARRIAGE: Primary | ICD-10-CM

## 2022-05-13 LAB
ABO + RH BLD: NORMAL
ANION GAP SERPL CALC-SCNC: 7 MMOL/L (ref 7–16)
BASOPHILS # BLD: 0 K/UL (ref 0–0.2)
BASOPHILS NFR BLD: 0 % (ref 0–2)
BUN SERPL-MCNC: 10 MG/DL (ref 6–23)
CALCIUM SERPL-MCNC: 8.9 MG/DL (ref 8.3–10.4)
CHLORIDE SERPL-SCNC: 106 MMOL/L (ref 98–107)
CO2 SERPL-SCNC: 27 MMOL/L (ref 21–32)
CREAT SERPL-MCNC: 0.69 MG/DL (ref 0.6–1)
DIFFERENTIAL METHOD BLD: NORMAL
EOSINOPHIL # BLD: 0.1 K/UL (ref 0–0.8)
EOSINOPHIL NFR BLD: 2 % (ref 0.5–7.8)
ERYTHROCYTE [DISTWIDTH] IN BLOOD BY AUTOMATED COUNT: 13.1 % (ref 11.9–14.6)
GLUCOSE SERPL-MCNC: 83 MG/DL (ref 65–100)
HCG SERPL-ACNC: 170 MIU/ML (ref 0–6)
HCT VFR BLD AUTO: 38.1 % (ref 35.8–46.3)
HGB BLD-MCNC: 12.5 G/DL (ref 11.7–15.4)
IMM GRANULOCYTES # BLD AUTO: 0 K/UL (ref 0–0.5)
IMM GRANULOCYTES NFR BLD AUTO: 0 % (ref 0–5)
LYMPHOCYTES # BLD: 3.4 K/UL (ref 0.5–4.6)
LYMPHOCYTES NFR BLD: 37 % (ref 13–44)
MCH RBC QN AUTO: 27.6 PG (ref 26.1–32.9)
MCHC RBC AUTO-ENTMCNC: 32.8 G/DL (ref 31.4–35)
MCV RBC AUTO: 84.1 FL (ref 79.6–97.8)
MONOCYTES # BLD: 0.5 K/UL (ref 0.1–1.3)
MONOCYTES NFR BLD: 5 % (ref 4–12)
NEUTS SEG # BLD: 5.1 K/UL (ref 1.7–8.2)
NEUTS SEG NFR BLD: 55 % (ref 43–78)
NRBC # BLD: 0 K/UL (ref 0–0.2)
PLATELET # BLD AUTO: 313 K/UL (ref 150–450)
PMV BLD AUTO: 9.9 FL (ref 9.4–12.3)
POTASSIUM SERPL-SCNC: 3.5 MMOL/L (ref 3.5–5.1)
RBC # BLD AUTO: 4.53 M/UL (ref 4.05–5.2)
SODIUM SERPL-SCNC: 140 MMOL/L (ref 136–145)
WBC # BLD AUTO: 9.2 K/UL (ref 4.3–11.1)

## 2022-05-13 PROCEDURE — 84702 CHORIONIC GONADOTROPIN TEST: CPT

## 2022-05-13 PROCEDURE — 86900 BLOOD TYPING SEROLOGIC ABO: CPT

## 2022-05-13 PROCEDURE — 76817 TRANSVAGINAL US OBSTETRIC: CPT

## 2022-05-13 PROCEDURE — 99284 EMERGENCY DEPT VISIT MOD MDM: CPT

## 2022-05-13 PROCEDURE — 80048 BASIC METABOLIC PNL TOTAL CA: CPT

## 2022-05-13 PROCEDURE — 85025 COMPLETE CBC W/AUTO DIFF WBC: CPT

## 2022-05-13 NOTE — ED TRIAGE NOTES
Pt is around 6 weeks pregnant, started bleeding this morning, it has been light all day but cramping is getting worse. LMP 3/30/22.

## 2022-05-14 NOTE — ED NOTES
I have reviewed discharge instructions with the patient. The patient verbalized understanding. Patient left ED via Discharge Method: ambulatory to Home with self. Opportunity for questions and clarification provided. Patient given 0 scripts. To continue your aftercare when you leave the hospital, you may receive an automated call from our care team to check in on how you are doing. This is a free service and part of our promise to provide the best care and service to meet your aftercare needs.  If you have questions, or wish to unsubscribe from this service please call [phone]. Thank you for Choosing our [facility] Emergency Department.

## 2022-05-14 NOTE — ED PROVIDER NOTES
25-year-old female tells me she is G1, P0 last menstrual period March 30. Patient noticed some spotting today that is continued throughout the day. No clots. She is having some suprapubic cramping. No back pain. No fever chills or vomiting. No dysuria. Initial OB appointment next week. Does not know her blood type. No ultrasound with this pregnancy thus far. The history is provided by the patient. Pregnancy Problem   This is a new problem. The current episode started 6 to 12 hours ago. The problem occurs constantly. The problem has not changed since onset. The pain is located in the suprapubic region. The quality of the pain is dull and cramping. The pain is moderate. Pertinent negatives include no fever, no diarrhea, no nausea, no vomiting, no dysuria, no frequency, no hematuria, no chest pain and no back pain. Nothing worsens the pain. The pain is relieved by nothing. Vaginal Bleeding  Associated symptoms include abdominal pain. Pertinent negatives include no chest pain.         Past Medical History:   Diagnosis Date    Urinary tract infection, site not specified     Vesicoureteral reflux, unspecified or without reflux nephropathy 1/7/2014       Past Surgical History:   Procedure Laterality Date    HX UROLOGICAL           Family History:   Problem Relation Age of Onset    Hypertension Mother     Anemia Mother     Diabetes Father     Hypertension Father        Social History     Socioeconomic History    Marital status:      Spouse name: Not on file    Number of children: Not on file    Years of education: Not on file    Highest education level: Not on file   Occupational History    Not on file   Tobacco Use    Smoking status: Never Smoker    Smokeless tobacco: Never Used   Substance and Sexual Activity    Alcohol use: No    Drug use: No    Sexual activity: Yes     Partners: Male     Birth control/protection: Pill   Other Topics Concern    Not on file   Social History Narrative  Not on file     Social Determinants of Health     Financial Resource Strain:     Difficulty of Paying Living Expenses: Not on file   Food Insecurity:     Worried About Running Out of Food in the Last Year: Not on file    [place] of Food in the Last Year: Not on file   Transportation Needs:     Lack of Transportation (Medical): Not on file    Lack of Transportation (Non-Medical): Not on file   Physical Activity:     Days of Exercise per Week: Not on file    Minutes of Exercise per Session: Not on file   Stress:     Feeling of Stress : Not on file   Social Connections:     Frequency of Communication with Friends and Family: Not on file    Frequency of Social Gatherings with Friends and Family: Not on file    Attends Jewish Services: Not on file    Active Member of [address] [place] [address] or Organizations: Not on file    Attends Club or Organization Meetings: Not on file    Marital Status: Not on file   Intimate Partner Violence:     Fear of Current or Ex-Partner: Not on file    Emotionally Abused: Not on file    Physically Abused: Not on file    Sexually Abused: Not on file   Housing Stability:     Unable to Pay for Housing in the Last Year: Not on file    Number of [place] in the Last Year: Not on file    Unstable Housing in the Last Year: Not on file         ALLERGIES: Shellfish derived    Review of Systems   Constitutional: Negative for chills and fever. Respiratory: Negative for cough. Cardiovascular: Negative for chest pain. Gastrointestinal: Positive for abdominal pain. Negative for diarrhea, nausea and vomiting. Genitourinary: Positive for vaginal bleeding. Negative for dysuria, frequency and hematuria. Musculoskeletal: Negative for back pain. Vitals:    05/13/22 1935   BP: 127/78   Pulse: 87   Resp: 16   Temp: 98 °F (36.7 °C)   SpO2: 99%            Physical Exam  Vitals and nursing note reviewed. Constitutional:       Appearance: She is not ill-appearing.    Cardiovascular: Rate and Rhythm: Normal rate. Rhythm irregular. Abdominal:      Tenderness: There is abdominal tenderness in the suprapubic area. There is no right CVA tenderness or left CVA tenderness. Negative signs include Galvez's sign and McBurney's sign. Skin:     General: Skin is warm and dry. Neurological:      Mental Status: She is alert. MDM  Number of Diagnoses or Management Options  Diagnosis management comments: First trimester pregnancy vaginal bleeding and cramping. Concern for threatened miscarriage, ultrasound also to check for ectopic pregnancy. Check blood type, hemoglobin. Amount and/or Complexity of Data Reviewed  Clinical lab tests: ordered and reviewed  Tests in the radiology section of CPT®: ordered and reviewed    Risk of Complications, Morbidity, and/or Mortality  Presenting problems: moderate  Diagnostic procedures: minimal  Management options: low    Patient Progress  Patient progress: stable         Procedures    Results Include:    Recent Results (from the past 24 hour(s))   CBC WITH AUTOMATED DIFF    Collection Time: 05/13/22  7:51 PM   Result Value Ref Range    WBC 9.2 4.3 - 11.1 K/uL    RBC 4.53 4.05 - 5.2 M/uL    HGB 12.5 11.7 - 15.4 g/dL    HCT 38.1 35.8 - 46.3 %    MCV 84.1 79.6 - 97.8 FL    MCH 27.6 26.1 - 32.9 PG    MCHC 32.8 31.4 - 35.0 g/dL    RDW 13.1 11.9 - 14.6 %    PLATELET 826 551 - 196 K/uL    MPV 9.9 9.4 - 12.3 FL    ABSOLUTE NRBC 0.00 0.0 - 0.2 K/uL    DF AUTOMATED      NEUTROPHILS 55 43 - 78 %    LYMPHOCYTES 37 13 - 44 %    MONOCYTES 5 4.0 - 12.0 %    EOSINOPHILS 2 0.5 - 7.8 %    BASOPHILS 0 0.0 - 2.0 %    IMMATURE GRANULOCYTES 0 0.0 - 5.0 %    ABS. NEUTROPHILS 5.1 1.7 - 8.2 K/UL    ABS. LYMPHOCYTES 3.4 0.5 - 4.6 K/UL    ABS. MONOCYTES 0.5 0.1 - 1.3 K/UL    ABS. EOSINOPHILS 0.1 0.0 - 0.8 K/UL    ABS. BASOPHILS 0.0 0.0 - 0.2 K/UL    ABS. IMM.  GRANS. 0.0 0.0 - 0.5 K/UL   METABOLIC PANEL, BASIC    Collection Time: 05/13/22  7:51 PM   Result Value Ref Range Sodium 140 136 - 145 mmol/L    Potassium 3.5 3.5 - 5.1 mmol/L    Chloride 106 98 - 107 mmol/L    CO2 27 21 - 32 mmol/L    Anion gap 7 7 - 16 mmol/L    Glucose 83 65 - 100 mg/dL    BUN 10 6 - 23 MG/DL    Creatinine 0.69 0.6 - 1.0 MG/DL    GFR est AA >60 >60 ml/min/1.73m2    GFR est non-AA >60 >60 ml/min/1.73m2    Calcium 8.9 8.3 - 10.4 MG/DL   BETA HCG, QT    Collection Time: 05/13/22  7:51 PM   Result Value Ref Range    Beta HCG,  (H) 0.0 - 6.0 MIU/ML   BLOOD TYPE, (ABO+RH)    Collection Time: 05/13/22  7:51 PM   Result Value Ref Range    ABO/Rh(D) B POSITIVE      US PREG UTS LTD    Result Date: 5/13/2022  Pelvis ultrasound INDICATION: Pregnant. Beta-hCG 170 COMPARISON: None. TECHNIQUE: Transabdominal and transvaginal ultrasound was performed of the pelvis FINDINGS: Transabdominal findings: The uterus is not well visualized. Transvaginal findings: The uterus is normal in echogenicity, contour and size and measures 5.8 x 4.0 x 4.3 cm. Endometrium is thickened with endometrial echo complex measuring 15 mm. No intrauterine pregnancy seen. A small nabothian cyst is noted. Left ovary is unremarkable and measures 2.6 x 2.0 x 2.6 cm. The right ovary measures 2.4 x 1.8 x 1.7 cm. There is color Doppler flow. There is a 5 mm echogenic structure within the right adnexa, surrounded by moderate free fluid. 1. Pregnancy of unknown location. No intrauterine pregnancy is identified at this time, not unexpected given the low beta hCG level. Short-term ultrasound and serial beta hCG follow-up are recommended to reevaluate, as ectopic pregnancy is not entirely excluded. 2. A 5 mm echogenic structure is identified in the right adnexa, surrounded by moderate free fluid. Findings nonspecific. Close follow-up is recommended, given that ectopic pregnancy is not excluded. DC2       Ectopic precautions given to the patient. She will call the GYN doctor on Monday for appointment recheck.   Recheck sooner for worse pain worse bleeding high fever.

## 2022-05-14 NOTE — DISCHARGE INSTRUCTIONS
Corrinne Burows of fluids. Tylenol for cramping. Call your OB/GYN doctor on Monday for appointment to recheck. Recheck sooner for passing lots of clots, passing out or high fever. Results Include:    Recent Results (from the past 24 hour(s))   CBC WITH AUTOMATED DIFF    Collection Time: 05/13/22  7:51 PM   Result Value Ref Range    WBC 9.2 4.3 - 11.1 K/uL    RBC 4.53 4.05 - 5.2 M/uL    HGB 12.5 11.7 - 15.4 g/dL    HCT 38.1 35.8 - 46.3 %    MCV 84.1 79.6 - 97.8 FL    MCH 27.6 26.1 - 32.9 PG    MCHC 32.8 31.4 - 35.0 g/dL    RDW 13.1 11.9 - 14.6 %    PLATELET 858 270 - 641 K/uL    MPV 9.9 9.4 - 12.3 FL    ABSOLUTE NRBC 0.00 0.0 - 0.2 K/uL    DF AUTOMATED      NEUTROPHILS 55 43 - 78 %    LYMPHOCYTES 37 13 - 44 %    MONOCYTES 5 4.0 - 12.0 %    EOSINOPHILS 2 0.5 - 7.8 %    BASOPHILS 0 0.0 - 2.0 %    IMMATURE GRANULOCYTES 0 0.0 - 5.0 %    ABS. NEUTROPHILS 5.1 1.7 - 8.2 K/UL    ABS. LYMPHOCYTES 3.4 0.5 - 4.6 K/UL    ABS. MONOCYTES 0.5 0.1 - 1.3 K/UL    ABS. EOSINOPHILS 0.1 0.0 - 0.8 K/UL    ABS. BASOPHILS 0.0 0.0 - 0.2 K/UL    ABS. IMM. GRANS. 0.0 0.0 - 0.5 K/UL   METABOLIC PANEL, BASIC    Collection Time: 05/13/22  7:51 PM   Result Value Ref Range    Sodium 140 136 - 145 mmol/L    Potassium 3.5 3.5 - 5.1 mmol/L    Chloride 106 98 - 107 mmol/L    CO2 27 21 - 32 mmol/L    Anion gap 7 7 - 16 mmol/L    Glucose 83 65 - 100 mg/dL    BUN 10 6 - 23 MG/DL    Creatinine 0.69 0.6 - 1.0 MG/DL    GFR est AA >60 >60 ml/min/1.73m2    GFR est non-AA >60 >60 ml/min/1.73m2    Calcium 8.9 8.3 - 10.4 MG/DL   BETA HCG, QT    Collection Time: 05/13/22  7:51 PM   Result Value Ref Range    Beta HCG,  (H) 0.0 - 6.0 MIU/ML   BLOOD TYPE, (ABO+RH)    Collection Time: 05/13/22  7:51 PM   Result Value Ref Range    ABO/Rh(D) B POSITIVE      US PREG UTS LTD    Result Date: 5/13/2022  Pelvis ultrasound INDICATION: Pregnant. Beta-hCG 170 COMPARISON: None.  TECHNIQUE: Transabdominal and transvaginal ultrasound was performed of the pelvis FINDINGS: Transabdominal findings: The uterus is not well visualized. Transvaginal findings: The uterus is normal in echogenicity, contour and size and measures 5.8 x 4.0 x 4.3 cm. Endometrium is thickened with endometrial echo complex measuring 15 mm. No intrauterine pregnancy seen. A small nabothian cyst is noted. Left ovary is unremarkable and measures 2.6 x 2.0 x 2.6 cm. The right ovary measures 2.4 x 1.8 x 1.7 cm. There is color Doppler flow. There is a 5 mm echogenic structure within the right adnexa, surrounded by moderate free fluid. 1. Pregnancy of unknown location. No intrauterine pregnancy is identified at this time, not unexpected given the low beta hCG level. Short-term ultrasound and serial beta hCG follow-up are recommended to reevaluate, as ectopic pregnancy is not entirely excluded. 2. A 5 mm echogenic structure is identified in the right adnexa, surrounded by moderate free fluid. Findings nonspecific. Close follow-up is recommended, given that ectopic pregnancy is not excluded.  Ankit Garcia

## 2022-05-26 ENCOUNTER — NURSE ONLY (OUTPATIENT)
Dept: OBGYN CLINIC | Age: 27
End: 2022-05-26

## 2022-05-26 DIAGNOSIS — O03.9 SAB (SPONTANEOUS ABORTION): Primary | ICD-10-CM

## 2022-05-26 LAB — HCG SERPL-ACNC: <1 MIU/ML (ref 0–6)

## 2022-05-31 ENCOUNTER — TELEPHONE (OUTPATIENT)
Dept: OBGYN CLINIC | Age: 27
End: 2022-05-31

## 2022-06-10 ENCOUNTER — OFFICE VISIT (OUTPATIENT)
Dept: OBGYN CLINIC | Age: 27
End: 2022-06-10
Payer: COMMERCIAL

## 2022-06-10 VITALS — WEIGHT: 166 LBS | HEIGHT: 66 IN | BODY MASS INDEX: 26.68 KG/M2

## 2022-06-10 DIAGNOSIS — O03.9 COMPLETE MISCARRIAGE: Primary | ICD-10-CM

## 2022-06-10 PROCEDURE — 99213 OFFICE O/P EST LOW 20 MIN: CPT | Performed by: OBSTETRICS & GYNECOLOGY

## 2022-06-10 RX ORDER — MEDROXYPROGESTERONE ACETATE 5 MG/1
5 TABLET ORAL DAILY
Qty: 5 TABLET | Refills: 0 | Status: SHIPPED | OUTPATIENT
Start: 2022-06-10 | End: 2022-08-02 | Stop reason: ALTCHOICE

## 2022-06-10 NOTE — PROGRESS NOTES
Patient presents today for   Chief Complaint   Patient presents with    Follow-up     Pt has no complaints    Miscarriage         LMP: No LMP recorded (lmp unknown). Contraception: none        Allergies   Allergen Reactions    Shellfish-Derived Products Nausea And Vomiting     Current Outpatient Medications   Medication Sig Dispense Refill    medroxyPROGESTERone (PROVERA) 5 MG tablet Take 1 tablet by mouth daily for 5 days 5 tablet 0     No current facility-administered medications for this visit. Past Medical History:   Diagnosis Date    Urinary tract infection, site not specified     Vesicoureteral reflux, unspecified or without reflux nephropathy 1/7/2014     Past Surgical History:   Procedure Laterality Date    UROLOGICAL SURGERY       Social History     Socioeconomic History    Marital status:      Spouse name: Not on file    Number of children: Not on file    Years of education: Not on file    Highest education level: Not on file   Occupational History    Not on file   Tobacco Use    Smoking status: Never Smoker    Smokeless tobacco: Never Used   Vaping Use    Vaping Use: Never used   Substance and Sexual Activity    Alcohol use: No    Drug use: No    Sexual activity: Yes     Partners: Male     Birth control/protection: Pill   Other Topics Concern    Not on file   Social History Narrative    Not on file     Social Determinants of Health     Financial Resource Strain:     Difficulty of Paying Living Expenses: Not on file   Food Insecurity:     Worried About [address] in the Last Year: Not on file    [address] in the Last Year: Not on file   Transportation Needs:     Lack of Transportation (Medical): Not on file    Lack of Transportation (Non-Medical):  Not on file   Physical Activity:     Days of Exercise per Week: Not on file    Minutes of Exercise per Session: Not on file   Stress:     Feeling of Stress : Not on file   Social Connections:     Frequency of Communication with Friends and Family: Not on file    Frequency of Social Gatherings with Friends and Family: Not on file    Attends Am[employer] Services: Not on file    Active Member of Clubs or Organizations: Not on file    Attends Club or Organization Meetings: Not on file    Marital Status: Not on file   Intimate Partner Violence:     Fear of Current or Ex-Partner: Not on file    Emotionally Abused: Not on file    Physically Abused: Not on file    Sexually Abused: Not on file   Housing Stability:     Unable to Pay for Housing in the Last Year: Not on file    Number of [place] in the Last Year: Not on file    Unstable Housing in the Last Year: Not on file     Family History   Problem Relation Age of Onset    Hypertension Mother     Anemia Mother     Diabetes Father     Hypertension Father      Ht 5' 6\" (1.676 m)   Wt 166 lb (75.3 kg)   LMP  (LMP Unknown)   Breastfeeding No   BMI 26.79 kg/m²      Review of Systems   Constitutional: Negative. HENT: Negative. Eyes: Negative. Respiratory: Negative. Cardiovascular: Negative. Gastrointestinal: Negative. Endocrine: Negative. Genitourinary:        Recent miscarriage   Musculoskeletal: Negative. Allergic/Immunologic: Negative. Neurological: Negative. Hematological: Negative. Psychiatric/Behavioral: Negative. Physical Exam:  Constitutional: She appears well-developed and well-nourished. No distress. HENT: Normocephalic and atraumatic. Pulmonary/Chest: Respiratory effort normal  Abdomen: ND  Skin: She is not diaphoretic. She is not jaundiced. Psychiatric: She has a normal mood and affect. Her behavior is normal. Thought content normal. .  Neurologic: Normal gait, no gross deficits. AAOx3    1. Complete miscarriage      No orders of the defined types were placed in this encounter.     Orders Placed This Encounter   Medications    medroxyPROGESTERone (PROVERA) 5 MG tablet     Sig: Take 1 tablet by mouth daily for 5 days     Dispense:  5 tablet     Refill:  0     Discussed pr's recent miscarriage which appears complete. Reviewed common etiologies and rarity of recurrent miscarriage. Expected bleeding profile discussed and Rx for provera sent to take if does not have her period within 5wks and has neg UPT. Pt expressed understanding. Return if symptoms worsen or fail to improve.

## 2022-07-10 ASSESSMENT — ENCOUNTER SYMPTOMS
ALLERGIC/IMMUNOLOGIC NEGATIVE: 1
EYES NEGATIVE: 1
GASTROINTESTINAL NEGATIVE: 1
RESPIRATORY NEGATIVE: 1

## 2022-07-12 ENCOUNTER — OFFICE VISIT (OUTPATIENT)
Dept: FAMILY MEDICINE CLINIC | Facility: CLINIC | Age: 27
End: 2022-07-12
Payer: COMMERCIAL

## 2022-07-12 ENCOUNTER — NURSE TRIAGE (OUTPATIENT)
Dept: OTHER | Facility: CLINIC | Age: 27
End: 2022-07-12

## 2022-07-12 VITALS
DIASTOLIC BLOOD PRESSURE: 65 MMHG | HEART RATE: 91 BPM | SYSTOLIC BLOOD PRESSURE: 110 MMHG | HEIGHT: 66 IN | WEIGHT: 167 LBS | BODY MASS INDEX: 26.84 KG/M2

## 2022-07-12 DIAGNOSIS — H69.83 DYSFUNCTION OF BOTH EUSTACHIAN TUBES: ICD-10-CM

## 2022-07-12 DIAGNOSIS — R53.82 CHRONIC FATIGUE: ICD-10-CM

## 2022-07-12 DIAGNOSIS — J30.9 ALLERGIC RHINITIS, UNSPECIFIED SEASONALITY, UNSPECIFIED TRIGGER: Primary | ICD-10-CM

## 2022-07-12 DIAGNOSIS — R42 DIZZINESS: ICD-10-CM

## 2022-07-12 PROCEDURE — 99214 OFFICE O/P EST MOD 30 MIN: CPT | Performed by: FAMILY MEDICINE

## 2022-07-12 RX ORDER — PREDNISONE 10 MG/1
TABLET ORAL
Qty: 21 EACH | Refills: 0 | Status: SHIPPED | OUTPATIENT
Start: 2022-07-12 | End: 2022-07-26 | Stop reason: CLARIF

## 2022-07-12 ASSESSMENT — ENCOUNTER SYMPTOMS
CHEST TIGHTNESS: 0
BLOOD IN STOOL: 0
ABDOMINAL PAIN: 0
SHORTNESS OF BREATH: 0

## 2022-07-12 NOTE — PROGRESS NOTES
[facility]  _______________________________________  MD Jaye Pope, [place]    [address], [address] [place] [address] Avenue  Phone: [phone]  Fax: [phone]    Jose Rangel (:  1995) is a 32 y.o. female,Established patient, here for evaluation of the following chief complaint(s):  Dizziness (x  after flying home ) and Nausea         ASSESSMENT/PLAN:    1. Allergic rhinitis, unspecified seasonality, unspecified trigger  Will do a round of prednisone and also a full bottle of flonase to get her ETD under control.   - predniSONE 10 MG (21) TBPK; Use per package direction  Dispense: 21 each; Refill: 0    2. Dysfunction of both eustachian tubes  As above. Prednisone and flonase as above. - predniSONE 10 MG (21) TBPK; Use per package direction  Dispense: 21 each; Refill: 0    3. Dizziness  No inducible nystagmus today, but if dizziness persists in spite of treatment, consider ENT referral for further testing. 4. Chronic fatigue  Came up on ROS, check CBC, CMP, TSH. FU PRN        Subjective   SUBJECTIVE/OBJECTIVE:    Having and dizziness x 2+ days after flying home from [place]. Last bout of COVID was 2020. 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 Hasn't had a shift there since . She feels like she is still at high altitude. Has has more frequent motion sickness since having COVID. LMP was 22. Lab Results   Component Value Date    TSH 0.866 07/15/2021     BP Readings from Last 3 Encounters:   22 110/65   22 114/70   21 122/84     UA WNL  Urine HCG neg    Review of Systems   Constitutional: Negative for chills and fever. Respiratory: Negative for chest tightness and shortness of breath. Cardiovascular: Negative for chest pain. Gastrointestinal: Negative for abdominal pain and blood in stool. Genitourinary: Negative for hematuria. electronic signature was used to authenticate this note.     --Luiza Epstein MD

## 2022-07-12 NOTE — TELEPHONE ENCOUNTER
Received call from Raj Marshall at [facility] with [employer]. Subjective: Caller states \"Got off my flight 2 days ago and I feel dizzy\"     Current Symptoms: Dizziness since was on her flight x2 days ago. Still with dizziness. Was nauseated and dry heaving while on the plane. Her limbs feel heavy. Still dizzy as if she is still in the air on the plane or \"out of body\" with nausea. No other symptoms. Able to walk with out assistance. Drinking water. Yesterday had a pain in her L chest area when would move her L arm. Pain was intermittent for 30 minutes. Has not returned. Severe headache and double vision yesterday, resolved. Onset: 2 days ago; improving    Associated Symptoms: NA    Pain Severity: 0/10; N/A; none    What has been tried: drinking water    Recommended disposition: Go to [facility]    Care advice provided, patient verbalizes understanding; denies any other questions or concerns; instructed to call back for any new or worsening symptoms. Patient/Caller agrees with recommended disposition; writer provided warm transfer to [facility] at [facility] for appointment scheduling     Attention Provider: Thank you for allowing me to participate in the care of your patient. The patient was connected to triage in response to information provided to the ECC/PSC. Please do not respond through this encounter as the response is not directed to a shared pool.     Reason for Disposition   Lightheadedness (dizziness) present now, after 2 hours of rest and fluids    Protocols used: DIZZINESS-ADULT-[place]

## 2022-07-13 LAB
ALBUMIN SERPL-MCNC: 3.7 G/DL (ref 3.5–5)
ALBUMIN/GLOB SERPL: 0.9 {RATIO} (ref 1.2–3.5)
ALP SERPL-CCNC: 121 U/L (ref 50–136)
ALT SERPL-CCNC: 30 U/L (ref 12–65)
ANION GAP SERPL CALC-SCNC: 3 MMOL/L (ref 7–16)
AST SERPL-CCNC: 15 U/L (ref 15–37)
BASOPHILS # BLD: 0 K/UL (ref 0–0.2)
BASOPHILS NFR BLD: 0 % (ref 0–2)
BILIRUB SERPL-MCNC: 0.2 MG/DL (ref 0.2–1.1)
BUN SERPL-MCNC: 12 MG/DL (ref 6–23)
CALCIUM SERPL-MCNC: 9 MG/DL (ref 8.3–10.4)
CHLORIDE SERPL-SCNC: 107 MMOL/L (ref 98–107)
CO2 SERPL-SCNC: 28 MMOL/L (ref 21–32)
CREAT SERPL-MCNC: 0.8 MG/DL (ref 0.6–1)
DIFFERENTIAL METHOD BLD: NORMAL
EOSINOPHIL # BLD: 0.1 K/UL (ref 0–0.8)
EOSINOPHIL NFR BLD: 1 % (ref 0.5–7.8)
ERYTHROCYTE [DISTWIDTH] IN BLOOD BY AUTOMATED COUNT: 12.6 % (ref 11.9–14.6)
GLOBULIN SER CALC-MCNC: 4 G/DL (ref 2.3–3.5)
GLUCOSE SERPL-MCNC: 82 MG/DL (ref 65–100)
HCT VFR BLD AUTO: 40.3 % (ref 35.8–46.3)
HGB BLD-MCNC: 12.7 G/DL (ref 11.7–15.4)
IMM GRANULOCYTES # BLD AUTO: 0 K/UL (ref 0–0.5)
IMM GRANULOCYTES NFR BLD AUTO: 0 % (ref 0–5)
LYMPHOCYTES # BLD: 2.5 K/UL (ref 0.5–4.6)
LYMPHOCYTES NFR BLD: 30 % (ref 13–44)
MCH RBC QN AUTO: 27.8 PG (ref 26.1–32.9)
MCHC RBC AUTO-ENTMCNC: 31.5 G/DL (ref 31.4–35)
MCV RBC AUTO: 88.2 FL (ref 79.6–97.8)
MONOCYTES # BLD: 0.5 K/UL (ref 0.1–1.3)
MONOCYTES NFR BLD: 6 % (ref 4–12)
NEUTS SEG # BLD: 5.2 K/UL (ref 1.7–8.2)
NEUTS SEG NFR BLD: 63 % (ref 43–78)
NRBC # BLD: 0 K/UL (ref 0–0.2)
PLATELET # BLD AUTO: 304 K/UL (ref 150–450)
PMV BLD AUTO: 10.2 FL (ref 9.4–12.3)
POTASSIUM SERPL-SCNC: 4.2 MMOL/L (ref 3.5–5.1)
PROT SERPL-MCNC: 7.7 G/DL (ref 6.3–8.2)
RBC # BLD AUTO: 4.57 M/UL (ref 4.05–5.2)
SODIUM SERPL-SCNC: 138 MMOL/L (ref 136–145)
TSH, 3RD GENERATION: 0.62 UIU/ML (ref 0.36–3.74)
WBC # BLD AUTO: 8.4 K/UL (ref 4.3–11.1)

## 2022-07-14 DIAGNOSIS — R77.1 HYPERGLOBULINEMIA: Primary | ICD-10-CM

## 2022-07-18 DIAGNOSIS — R77.1 HYPERGLOBULINEMIA: ICD-10-CM

## 2022-07-19 ENCOUNTER — TELEPHONE (OUTPATIENT)
Dept: FAMILY MEDICINE CLINIC | Facility: CLINIC | Age: 27
End: 2022-07-19

## 2022-07-19 DIAGNOSIS — R42 DIZZINESS: Primary | ICD-10-CM

## 2022-07-19 DIAGNOSIS — J30.9 ALLERGIC RHINITIS, UNSPECIFIED SEASONALITY, UNSPECIFIED TRIGGER: ICD-10-CM

## 2022-07-19 DIAGNOSIS — H69.83 DYSFUNCTION OF BOTH EUSTACHIAN TUBES: ICD-10-CM

## 2022-07-19 NOTE — TELEPHONE ENCOUNTER
Patient called stating that she is having the same problems she had when she saw Dr. David Dempsey on 7-12-22. She would like a referral placed to see a ENT for this.

## 2022-07-20 ENCOUNTER — PATIENT MESSAGE (OUTPATIENT)
Dept: FAMILY MEDICINE CLINIC | Facility: CLINIC | Age: 27
End: 2022-07-20

## 2022-07-20 NOTE — TELEPHONE ENCOUNTER
From: Moses Abdi  To: Dr. Yandel Doherty  Sent: 7/20/2022 12:09 PM EDT  Subject: Medication question     Hi. So I have gotten my ENT appointment set for 8/5/2022. But I just have a question that I was unable to ask while I was in the ER on Monday. My WBC Went from 9-14.2 in a couple days. I was told I have a bad onset of vertigo but could it be due to an ear infection? I feel completely fine other than the dizziness and lightheaded ness. I am on day 10 with this and was wondering if we could try an antibiotic for an ear infection incase that is what was causing my vertigo and I wont be able to be seen by ENT until 2 weeks from now to really know. Please let me know if this is a possibility. Thank you.

## 2022-07-21 LAB
ALBUMIN SERPL ELPH-MCNC: 3.7 G/DL (ref 2.9–4.4)
ALBUMIN/GLOB SERPL: 1.2 {RATIO} (ref 0.7–1.7)
ALPHA1 GLOB SERPL ELPH-MCNC: 0.2 G/DL (ref 0–0.4)
ALPHA2 GLOB SERPL ELPH-MCNC: 0.8 G/DL (ref 0.4–1)
B-GLOBULIN SERPL ELPH-MCNC: 1.1 G/DL (ref 0.7–1.3)
GAMMA GLOB SERPL ELPH-MCNC: 1 G/DL (ref 0.4–1.8)
GLOBULIN SER CALC-MCNC: 3 G/DL (ref 2.2–3.9)
M PROTEIN SERPL ELPH-MCNC: NORMAL G/DL
PROT SERPL-MCNC: 6.7 G/DL (ref 6–8.5)

## 2022-07-21 RX ORDER — AMOXICILLIN AND CLAVULANATE POTASSIUM 875; 125 MG/1; MG/1
1 TABLET, FILM COATED ORAL 2 TIMES DAILY
Qty: 14 TABLET | Refills: 0 | Status: SHIPPED | OUTPATIENT
Start: 2022-07-21 | End: 2022-07-21 | Stop reason: SDUPTHER

## 2022-07-21 RX ORDER — AMOXICILLIN AND CLAVULANATE POTASSIUM 875; 125 MG/1; MG/1
1 TABLET, FILM COATED ORAL 2 TIMES DAILY
Qty: 14 TABLET | Refills: 0 | Status: SHIPPED | OUTPATIENT
Start: 2022-07-21 | End: 2022-08-02 | Stop reason: ALTCHOICE

## 2022-07-25 ENCOUNTER — OFFICE VISIT (OUTPATIENT)
Dept: ENT CLINIC | Age: 27
End: 2022-07-25
Payer: COMMERCIAL

## 2022-07-25 ENCOUNTER — TELEPHONE (OUTPATIENT)
Dept: FAMILY MEDICINE CLINIC | Facility: CLINIC | Age: 27
End: 2022-07-25

## 2022-07-25 VITALS
SYSTOLIC BLOOD PRESSURE: 108 MMHG | HEIGHT: 66 IN | BODY MASS INDEX: 26.52 KG/M2 | WEIGHT: 165 LBS | DIASTOLIC BLOOD PRESSURE: 72 MMHG

## 2022-07-25 DIAGNOSIS — M26.609 TMJ (TEMPOROMANDIBULAR JOINT DISORDER): ICD-10-CM

## 2022-07-25 DIAGNOSIS — R42 DISEQUILIBRIUM: ICD-10-CM

## 2022-07-25 DIAGNOSIS — J34.2 NASAL SEPTAL DEVIATION: ICD-10-CM

## 2022-07-25 DIAGNOSIS — J34.3 HYPERTROPHY OF BOTH INFERIOR NASAL TURBINATES: ICD-10-CM

## 2022-07-25 DIAGNOSIS — R42 DIZZINESS: ICD-10-CM

## 2022-07-25 DIAGNOSIS — H93.8X2 SENSATION OF FULLNESS IN LEFT EAR: ICD-10-CM

## 2022-07-25 DIAGNOSIS — J30.9 ALLERGIC RHINITIS, UNSPECIFIED SEASONALITY, UNSPECIFIED TRIGGER: Primary | ICD-10-CM

## 2022-07-25 PROCEDURE — 92567 TYMPANOMETRY: CPT | Performed by: STUDENT IN AN ORGANIZED HEALTH CARE EDUCATION/TRAINING PROGRAM

## 2022-07-25 PROCEDURE — 99204 OFFICE O/P NEW MOD 45 MIN: CPT | Performed by: STUDENT IN AN ORGANIZED HEALTH CARE EDUCATION/TRAINING PROGRAM

## 2022-07-25 ASSESSMENT — ENCOUNTER SYMPTOMS
ABDOMINAL DISTENTION: 0
EYE DISCHARGE: 0
COLOR CHANGE: 0
APNEA: 0

## 2022-07-25 NOTE — PROGRESS NOTES
[facility] ENT Office Note    Patient: Krystian Taveras  MRN: [id number]  : 1995  Gender:  female  Vital Signs: /72   Ht 5' 6\" (1.676 m)   Wt 165 lb (74.8 kg)   BMI 26.63 kg/m²   Date: 2022    CC:   Chief Complaint   Patient presents with    Dizziness     Patient presents today with c/o lightheadedness that is proceeded by headache , dizziness x 15 days. Patient states that this is improved by lying down or sleeping. HPI:  Krystian Taveras is a 32 y.o. female who endorses lightheadedness for 15 days, severity waxes and wanes. She endorses presysncope and nausea. She endorses anxiety. She has been treated with Augmentin, prednisone, and a benzodiazepine without improvement. She denies vertigo. She denies hearing loss. She endorses some left preauricular and infra-auricular pain. She has a history of migraines. She has not seen neurology. She feels like this started after a flight. Past Medical History, Past Surgical History, Family history, Social History, and Medications were all reviewed with the patient today and updated as necessary. Allergies   Allergen Reactions    Shellfish-Derived Products Nausea And Vomiting     Patient Active Problem List   Diagnosis    Chronic headache    Intractable migraine without aura    Colicky abdominal pain    Iron deficiency anemia    Change in bowel habits    Diarrhea    Maxillary sinus cyst    History of 2019 novel coronavirus disease (COVID-19)    Chronic fatigue    Palpitation    Vitamin D deficiency    Allergic rhinitis    Dysfunction of both eustachian tubes    Dizziness     Current Outpatient Medications   Medication Sig    amoxicillin-clavulanate (AUGMENTIN) 875-125 MG per tablet Take 1 tablet by mouth in the morning and 1 tablet before bedtime. Do all this for 7 days.     predniSONE 10 MG (21) TBPK Use per package direction    medroxyPROGESTERone (PROVERA) 5 MG tablet Take 1 tablet by mouth daily for 5 days     No current facility-administered medications for this visit. Past Medical History:   Diagnosis Date    Urinary tract infection, site not specified     Vesicoureteral reflux, unspecified or without reflux nephropathy 1/7/2014     Social History     Tobacco Use    Smoking status: Never    Smokeless tobacco: Never   Substance Use Topics    Alcohol use: No     Past Surgical History:   Procedure Laterality Date    UROLOGICAL SURGERY       Family History   Problem Relation Age of Onset    Hypertension Mother     Anemia Mother     Diabetes Father     Hypertension Father         ROS:    Review of Systems   Constitutional:  Negative for activity change. HENT:  Negative for congestion. Eyes:  Negative for discharge. Respiratory:  Negative for apnea. Cardiovascular:  Negative for chest pain. Gastrointestinal:  Negative for abdominal distention. Endocrine: Negative for cold intolerance. Genitourinary:  Negative for difficulty urinating. Musculoskeletal:  Negative for arthralgias. Skin:  Negative for color change. Allergic/Immunologic: Negative for environmental allergies. Neurological:  Positive for dizziness, light-headedness and headaches. Hematological:  Negative for adenopathy. Psychiatric/Behavioral:  Negative for agitation. PHYSICAL EXAM:    /72   Ht 5' 6\" (1.676 m)   Wt 165 lb (74.8 kg)   BMI 26.63 kg/m²     General: NAD, well-appearing  Neuro: No gross neuro deficits. CN's II-XII intact. No facial weakness. Eyes: EOMI. Pupils reactive. No periorbital edema/ecchymosis. Skin: No facial erythema, rashes or concerning lesions. Nose: No external deviations or saddling. Intranasally, septum is midline without perforations, nasal mucosa appears healthy with no erythema, mucopurulence, or polyps. Mouth: Moist mucus membranes, normal tongue/palate mobility, no concerning mucosal lesions. Oropharynx: clear with no erythema/exudate, no tonsillar hypertrophy.   Ears: Normal appearing auricles, no hematomas. EACs clear with no cerumen impaction, healthy canal skin, TM's intact with no perforations or retraction pockets. No middle ear effusions. Kizzy-Hallpike negative bilaterally  Normal type A tympanograms bilaterally  Mild TMJ clicking  Neck: Soft, supple, no palpable lateral neck masses. No parotid or submandibular masses. No thyromegaly or palpable thyroid nodules. No surgical scars. Lymphatics: No palpable cervical LAD. Resp/Lungs: No audible stridor or wheezing, CTAB  Heart: RRR  Extremities: No clubbing or cyanosis. Lab Results   Component Value Date    WBC 8.4 07/12/2022    HGB 12.7 07/12/2022    HCT 40.3 07/12/2022    MCV 88.2 07/12/2022     07/12/2022     Lab Results   Component Value Date/Time     07/12/2022 03:45 PM    K 4.2 07/12/2022 03:45 PM     07/12/2022 03:45 PM    CO2 28 07/12/2022 03:45 PM    BUN 12 07/12/2022 03:45 PM    CREATININE 0.80 07/12/2022 03:45 PM    GLUCOSE 82 07/12/2022 03:45 PM    CALCIUM 9.0 07/12/2022 03:45 PM      CT Head wo Contrast      Impression  Performed by [facility]  No CT evidence of acute intracranial abnormality. Signed by: 7/19/2022 4:31 AM: Joann Guzman MD, Namita Sage    Narrative  Performed by [facility]    CT HEAD WITHOUT CONTRAST     INDICATION: Dizziness, non-specific;Headache, new or worsening, neuro deficit (Age 19-49y);     COMPARISON: None. TECHNIQUE: Contiguous axial images of the head were obtained without intravenous contrast administration. 2-D images were generated and reviewed. FINDINGS:   .  Brain and CSF spaces: No acute large vascular territory ischemic changes, acute hemorrhage, hydrocephalus, or mass effect. .  Skull and scalp: No evidence of acute osseous abnormality. Mastoids are aerated. .  Paranasal sinuses: Imaged paranasal sinuses are aerated. .  Imaged orbits: No radiopaque foreign bodies or evidence of acute abnormality.       ASSESSMENT and PLAN  80-year-old female with dizziness that she describes primarily as lightheadedness for the past 2 weeks. She also is headaches and associated nausea. No vertigo. No hearing loss. Normal ear exam.  Normal tympanogram bilaterally. Kizzy-Hallpike was negative bilaterally. Normal sinuses and mastoids on CT scan. No indication of any in her ear etiology for her dizziness. More likely related to migraines. I will send her neurology referral.      ICD-10-CM    1. Allergic rhinitis, unspecified seasonality, unspecified trigger  J30.9       2. Dizziness  R42 [id number] [facility], Neurology, [facility]      3. Disequilibrium  R42       4. Hypertrophy of both inferior nasal turbinates  J34.3       5. Nasal septal deviation  J34.2       6.  Sensation of fullness in left ear  H93.8X2 TYMPANOMETRY      7. TMJ (temporomandibular joint disorder)  M26.609             Loy Booker MD  7/25/2022  Electronically signed

## 2022-07-25 NOTE — TELEPHONE ENCOUNTER
Pt went to the ENT and was told she needs a referral to neurology and cardiology as her symptoms were deemed not ENT related.

## 2022-07-25 NOTE — TELEPHONE ENCOUNTER
Patient had come in today to speak to Dr. Anand Travis about this today, but left because she was not feeling well. She is wanting to see if something can be given to her to help with the Migraine before she gets in with Neurology, because she know she will be waiting for a couple of months for this appointment. She also wants to see if the referral can be done as urgent? She has been placed for a virtual visit for tomorrow about this,  but wants a call back today as well so she can be informed.

## 2022-07-26 ENCOUNTER — TELEMEDICINE (OUTPATIENT)
Dept: FAMILY MEDICINE CLINIC | Facility: CLINIC | Age: 27
End: 2022-07-26
Payer: COMMERCIAL

## 2022-07-26 DIAGNOSIS — H81.10 BENIGN PAROXYSMAL POSITIONAL VERTIGO, UNSPECIFIED LATERALITY: ICD-10-CM

## 2022-07-26 DIAGNOSIS — R42 DIZZINESS: Primary | ICD-10-CM

## 2022-07-26 PROCEDURE — 99214 OFFICE O/P EST MOD 30 MIN: CPT | Performed by: FAMILY MEDICINE

## 2022-07-26 RX ORDER — MECLIZINE HYDROCHLORIDE 25 MG/1
25 TABLET ORAL EVERY 6 HOURS PRN
COMMUNITY
End: 2022-11-03

## 2022-07-26 RX ORDER — SUMATRIPTAN 50 MG/1
50-100 TABLET, FILM COATED ORAL
Qty: 12 TABLET | Refills: 0 | Status: SHIPPED | OUTPATIENT
Start: 2022-07-26 | End: 2022-08-15 | Stop reason: CLARIF

## 2022-07-26 ASSESSMENT — PATIENT HEALTH QUESTIONNAIRE - PHQ9
SUM OF ALL RESPONSES TO PHQ QUESTIONS 1-9: 0
SUM OF ALL RESPONSES TO PHQ QUESTIONS 1-9: 0
SUM OF ALL RESPONSES TO PHQ9 QUESTIONS 1 & 2: 0
2. FEELING DOWN, DEPRESSED OR HOPELESS: 0
SUM OF ALL RESPONSES TO PHQ QUESTIONS 1-9: 0
1. LITTLE INTEREST OR PLEASURE IN DOING THINGS: 0
SUM OF ALL RESPONSES TO PHQ QUESTIONS 1-9: 0

## 2022-07-26 NOTE — PROGRESS NOTES
Patty Flores (:  1995) is a Established patient, here for evaluation of the following:    Assessment & Plan   Below is the assessment and plan developed based on review of pertinent history, physical exam, labs, studies, and medications. 1. Dizziness  -     [facility] (Neurology Only)  -     SUMAtriptan (IMITREX) 50 MG tablet; Take 1-2 tablets by mouth once as needed for Migraine, Disp-12 tablet, R-0Normal  2. Benign paroxysmal positional vertigo, unspecified laterality  -     [facility] (Neurology Only)  No follow-ups on file. Suspect atypical migraine or BPPV. Do not suspect sinister cause as she has multiple w/u including imaging and ent reverral.   Imitrex today to try to break cycle. Steroids haven't helped in the past, or I would have tried that. Referral for vestibular therapy. Subjective   HPI  Chief Complaint   Patient presents with    Dizziness     And light headedness    Migraine     Will get very lightheaded with this and feels like she is going to pass out. , was on a plane and felt dizzy and nauseous. When she got off the plane, the dizziness was significantly worse. Saw Dr. Everrett Schaumann on  and given steroid and flonase w/ no relief. Went to ED and they did labs, CT, and treated for migraine. Told her she had vertigo. She is now feeling more lightheaded and dizzy, but denies room spinning. Referred to ENT. She was seen there yesterday and told her that it was not an ENT issue. They referred her to neurology. Feels like she is floated. She is concerned due to persistent symptoms and there is a length of time between now and when she will see neuro. She has been laying in bed for a few days and feels a little better over the last few days. She also saw eye doctor yesterday and they gave her a good reports.       Review of Systems   Constitutional:  Negative for diaphoresis and unexpected weight change. HENT:  Negative for congestion. Eyes:  Negative for visual disturbance. Respiratory:  Negative for cough and shortness of breath. Cardiovascular:  Negative for chest pain. Gastrointestinal:  Negative for constipation, diarrhea and vomiting. Genitourinary:  Negative for dysuria. Neurological:  Positive for light-headedness. Negative for headaches. Psychiatric/Behavioral:  Negative for dysphoric mood and sleep disturbance. The patient is not nervous/anxious.          Objective     Physical Exam  [INSTRUCTIONS:  \"[x]\" Indicates a positive item  \"[]\" Indicates a negative item  -- DELETE ALL ITEMS NOT EXAMINED]    Constitutional: [x] Appears well-developed and well-nourished [x] No apparent distress      [] Abnormal -     Mental status: [x] Alert and awake  [x] Oriented to person/place/time [x] Able to follow commands    [] Abnormal -     Eyes:   EOM    [x]  Normal    [] Abnormal -   Sclera  [x]  Normal    [] Abnormal -          Discharge [x]  None visible   [] Abnormal -     HENT: [x] Normocephalic, atraumatic  [] Abnormal -   [x] Mouth/Throat: Mucous membranes are moist    External Ears [x] Normal  [] Abnormal -    Neck: [x] No visualized mass [] Abnormal -     Pulmonary/Chest: [x] Respiratory effort normal   [x] No visualized signs of difficulty breathing or respiratory distress        [] Abnormal -      Musculoskeletal:   [x] Normal gait with no signs of ataxia         [x] Normal range of motion of neck        [] Abnormal -     Neurological:        [x] No Facial Asymmetry (Cranial nerve 7 motor function) (limited exam due to video visit)          [x] No gaze palsy        [] Abnormal -          Skin:        [x] No significant exanthematous lesions or discoloration noted on facial skin         [] Abnormal -            Psychiatric:       [x] Normal Affect [] Abnormal -        [x] No Hallucinations    Other pertinent observable physical exam findings:-       Patient-Reported Vitals 7/26/2022   Patient-Reported Weight 168   Patient-Reported Systolic 466   Patient-Reported Diastolic 70      Patient-Reported Vitals  No data recorded            Yissel Vital, was evaluated through a synchronous (real-time) audio-video encounter. The patient (or guardian if applicable) is aware that this is a billable service, which includes applicable co-pays. This Virtual Visit was conducted with patient's (and/or legal guardian's) consent. The visit was conducted pursuant to the emergency declaration under the [address] [place] [address] authority and the [employer] and [employer] Act. Patient identification was verified, and a caregiver was present when appropriate. The patient was located at Home: [address] [address].    Provider was [facility] Ann Schwab MD

## 2022-08-02 ASSESSMENT — ENCOUNTER SYMPTOMS
CONSTIPATION: 0
SHORTNESS OF BREATH: 0
VOMITING: 0
COUGH: 0
DIARRHEA: 0

## 2022-08-04 ENCOUNTER — TELEMEDICINE (OUTPATIENT)
Dept: FAMILY MEDICINE CLINIC | Facility: CLINIC | Age: 27
End: 2022-08-04
Payer: COMMERCIAL

## 2022-08-04 DIAGNOSIS — U07.1 COVID: Primary | ICD-10-CM

## 2022-08-04 PROCEDURE — 99213 OFFICE O/P EST LOW 20 MIN: CPT | Performed by: FAMILY MEDICINE

## 2022-08-04 RX ORDER — METHYLPREDNISOLONE 4 MG/1
6 TABLET ORAL DAILY
Qty: 8 TABLET | Refills: 0 | Status: SHIPPED | OUTPATIENT
Start: 2022-08-04 | End: 2022-08-09

## 2022-08-04 ASSESSMENT — ENCOUNTER SYMPTOMS
NAUSEA: 0
DIARRHEA: 0
ABDOMINAL PAIN: 0
SINUS PRESSURE: 1
WHEEZING: 0
SORE THROAT: 1
SHORTNESS OF BREATH: 0
VOMITING: 0
RHINORRHEA: 1
COUGH: 1

## 2022-08-04 ASSESSMENT — PATIENT HEALTH QUESTIONNAIRE - PHQ9
SUM OF ALL RESPONSES TO PHQ QUESTIONS 1-9: 0
1. LITTLE INTEREST OR PLEASURE IN DOING THINGS: 0
SUM OF ALL RESPONSES TO PHQ9 QUESTIONS 1 & 2: 0
SUM OF ALL RESPONSES TO PHQ QUESTIONS 1-9: 0
SUM OF ALL RESPONSES TO PHQ QUESTIONS 1-9: 0
2. FEELING DOWN, DEPRESSED OR HOPELESS: 0
SUM OF ALL RESPONSES TO PHQ QUESTIONS 1-9: 0

## 2022-08-04 NOTE — PROGRESS NOTES
Patty Flores (:  1995) is a Established patient, here for evaluation of the following:    Assessment & Plan   Below is the assessment and plan developed based on review of pertinent history, physical exam, labs, studies, and medications. 1. COVID  -     methylPREDNISolone (MEDROL) 4 MG tablet; Take 1.5 tablets by mouth in the morning for 5 days. , Disp-8 tablet, R-0Normal  No follow-ups on file. Subjective   HPI  Chief Complaint   Patient presents with    Positive For Covid-19     Tested on Tuesday morning, symptom stared on Monday night. Feels very weak and HR going up. She had prolonged tachycardia in the past with covid. Throat is very sore. Head and body hurts badly. Taking tylenol cold and flu with advil in between. Intermittent low grade fevers. Review of Systems   Constitutional:  Positive for chills, diaphoresis, fatigue and fever. HENT:  Positive for congestion, postnasal drip, rhinorrhea, sinus pressure, sneezing and sore throat. Negative for ear pain. Respiratory:  Positive for cough. Negative for shortness of breath and wheezing. Cardiovascular:  Negative for chest pain and palpitations. Gastrointestinal:  Negative for abdominal pain, diarrhea, nausea and vomiting. Musculoskeletal:  Positive for myalgias. Skin:  Negative for rash. Neurological:  Positive for headaches. Hematological:  Negative for adenopathy.         Objective     Physical Exam  [INSTRUCTIONS:  \"[x]\" Indicates a positive item  \"[]\" Indicates a negative item  -- DELETE ALL ITEMS NOT EXAMINED]    Constitutional: [x] Appears well-developed and well-nourished [x] No apparent distress      [] Abnormal -     Mental status: [x] Alert and awake  [x] Oriented to person/place/time [x] Able to follow commands    [] Abnormal -     Eyes:   EOM    [x]  Normal    [] Abnormal -   Sclera  [x]  Normal    [] Abnormal -          Discharge [x]  None visible   [] Abnormal -     HENT: [x] Normocephalic, atraumatic  [] Abnormal -   [x] Mouth/Throat: Mucous membranes are moist    External Ears [x] Normal  [] Abnormal -    Neck: [x] No visualized mass [] Abnormal -     Pulmonary/Chest: [x] Respiratory effort normal   [x] No visualized signs of difficulty breathing or respiratory distress        [] Abnormal -      Musculoskeletal:   [x] Normal gait with no signs of ataxia         [x] Normal range of motion of neck        [] Abnormal -     Neurological:        [x] No Facial Asymmetry (Cranial nerve 7 motor function) (limited exam due to video visit)          [x] No gaze palsy        [] Abnormal -          Skin:        [x] No significant exanthematous lesions or discoloration noted on facial skin         [] Abnormal -            Psychiatric:       [x] Normal Affect [] Abnormal -        [x] No Hallucinations    Other pertinent observable physical exam findings:-       Patient-Reported Vitals 7/26/2022   Patient-Reported Weight 168   Patient-Reported Systolic 959   Patient-Reported Diastolic 70      Patient-Reported Vitals  No data recorded          Jose Juan Carlos, was evaluated through a synchronous (real-time) audio-video encounter. The patient (or guardian if applicable) is aware that this is a billable service, which includes applicable co-pays. This Virtual Visit was conducted with patient's (and/or legal guardian's) consent. The visit was conducted pursuant to the emergency declaration under the [address] [place] [address] authority and the [employer] and [employer] General Act. Patient identification was verified, and a caregiver was present when appropriate. The patient was located at Home: [address] [place] [id number].    Provider was located at Home ([facility]): Juana Owens MD

## 2022-08-09 ENCOUNTER — TELEPHONE (OUTPATIENT)
Dept: CARDIOLOGY CLINIC | Age: 27
End: 2022-08-09

## 2022-08-09 ENCOUNTER — TELEPHONE (OUTPATIENT)
Dept: NEUROLOGY | Age: 27
End: 2022-08-09

## 2022-08-09 NOTE — TELEPHONE ENCOUNTER
Pt mother came by office crying very upset because her daughter past out aging after have been looked at over by different doctors She said they haven't found out what's going on. Patient is a travel nurse. Patient has appointment in Jan with Dr Kriss Lovell already scheduled     Reached out to Richard MASSEY to see if we can get sooner appointment . She did look over chart and patient had just tested positive for Covid on the 4th and also had miscarriage  in June . We are needing more information. To see what's going on . Called patient she has been to PCP 3 times. ENT 1 time, OB 3 Times ,  and EYE 2 times.      Richard Flood has given chart to Dr Angleo Ferrer to review

## 2022-08-09 NOTE — TELEPHONE ENCOUNTER
Has been having light headedness and weekness. Has been this way for 4 weeks. These symptoms pre date covid.

## 2022-08-09 NOTE — TELEPHONE ENCOUNTER
Pt c/o dizziness/lightheadedness since 7/10/22. Has been to PCP, ER, ENT with no relief and no identified causes. /70's. Today 130/80's. O2 sat is 100%  HR low 100's at rest; 120-130 with any activity. Recent diagnosis of COVID 8/4/22. Pt requests appt to discuss symptoms with Dr. Gilberto Carlisle as she has seen him in the past. Last seen 1/6/21. Pt states she drinks 2-3, 32oz bottles of water/day. Denies excessive caffeine, energy drinks, illegal substances. Informed pt tachycardia after COVID is not uncommon. Appt scheduled for pt to discuss symptoms with Dr. Gilberto Carlisle 8/15/22 at 2:30 at [facility] office. Encouraged hydration, monitor symptoms, and proceed to ER for evaluation they worsen or become severe prior to appt. Pt confirms appt date, time, and location, verbalizes understanding to all and agrees to plan.

## 2022-08-10 ENCOUNTER — PATIENT MESSAGE (OUTPATIENT)
Dept: FAMILY MEDICINE CLINIC | Facility: CLINIC | Age: 27
End: 2022-08-10

## 2022-08-10 NOTE — TELEPHONE ENCOUNTER
From: Little Malik  To: Dr. Chan Bars: 8/10/2022 3:04 PM EDT  Subject: Domenico  Dr. Anand Travis, I am still super lightheaded all day long. I have had no relief treating it for a migraine. Do you have any other idea or thoughts on what it may be? Neuro cant see me until January 18th and I cannot keep living like this. Please let me know when you can. Thanks.

## 2022-08-11 NOTE — TELEPHONE ENCOUNTER
I had Dr. Joann Galarza review the patients chart/referral, Dr. Familia Frost stated that the patient has disembarkment syndrome which can last minutes to weeks/months. The patient needs to have vestibular rehab and have orthostatics checked. The patient can still be seen by us once she has completed vestibular rehab. Dr. Joann Galarza is happy to speak with the referring provider and/or patients PCP. I sent referring provider and patients PCP an in box with the above information.

## 2022-08-15 ENCOUNTER — OFFICE VISIT (OUTPATIENT)
Dept: FAMILY MEDICINE CLINIC | Facility: CLINIC | Age: 27
End: 2022-08-15
Payer: COMMERCIAL

## 2022-08-15 ENCOUNTER — OFFICE VISIT (OUTPATIENT)
Dept: CARDIOLOGY CLINIC | Age: 27
End: 2022-08-15
Payer: COMMERCIAL

## 2022-08-15 VITALS
HEART RATE: 89 BPM | SYSTOLIC BLOOD PRESSURE: 119 MMHG | DIASTOLIC BLOOD PRESSURE: 77 MMHG | HEIGHT: 66 IN | WEIGHT: 167 LBS | OXYGEN SATURATION: 98 % | TEMPERATURE: 96.4 F | BODY MASS INDEX: 26.84 KG/M2 | RESPIRATION RATE: 20 BRPM

## 2022-08-15 VITALS
SYSTOLIC BLOOD PRESSURE: 122 MMHG | WEIGHT: 167 LBS | BODY MASS INDEX: 26.84 KG/M2 | DIASTOLIC BLOOD PRESSURE: 78 MMHG | HEART RATE: 60 BPM | HEIGHT: 66 IN

## 2022-08-15 DIAGNOSIS — R00.0 TACHYCARDIA, UNSPECIFIED: ICD-10-CM

## 2022-08-15 DIAGNOSIS — R00.2 PALPITATION: Primary | ICD-10-CM

## 2022-08-15 DIAGNOSIS — F43.22 ADJUSTMENT DISORDER WITH ANXIOUS MOOD: ICD-10-CM

## 2022-08-15 DIAGNOSIS — G43.011 INTRACTABLE MIGRAINE WITHOUT AURA AND WITH STATUS MIGRAINOSUS: ICD-10-CM

## 2022-08-15 DIAGNOSIS — R00.0 TACHYCARDIA: ICD-10-CM

## 2022-08-15 DIAGNOSIS — R00.0 TACHYCARDIA, UNSPECIFIED: Primary | ICD-10-CM

## 2022-08-15 DIAGNOSIS — R06.02 SHORTNESS OF BREATH: ICD-10-CM

## 2022-08-15 LAB — D DIMER PPP FEU-MCNC: <0.27 UG/ML(FEU)

## 2022-08-15 PROCEDURE — 99214 OFFICE O/P EST MOD 30 MIN: CPT | Performed by: INTERNAL MEDICINE

## 2022-08-15 PROCEDURE — 99214 OFFICE O/P EST MOD 30 MIN: CPT | Performed by: FAMILY MEDICINE

## 2022-08-15 RX ORDER — IBUPROFEN 200 MG
200 TABLET ORAL EVERY 6 HOURS PRN
COMMUNITY

## 2022-08-15 RX ORDER — ACETAMINOPHEN 325 MG/1
650 TABLET ORAL EVERY 6 HOURS PRN
COMMUNITY

## 2022-08-15 RX ORDER — BACILLUS COAGULANS/INULIN 1B-250 MG
CAPSULE ORAL
COMMUNITY

## 2022-08-15 RX ORDER — SERTRALINE HYDROCHLORIDE 25 MG/1
25 TABLET, FILM COATED ORAL DAILY
Qty: 30 TABLET | Refills: 3 | Status: CANCELLED | OUTPATIENT
Start: 2022-08-15

## 2022-08-15 RX ORDER — RIMEGEPANT SULFATE 75 MG/75MG
TABLET, ORALLY DISINTEGRATING ORAL
Qty: 30 TABLET | Status: CANCELLED | OUTPATIENT
Start: 2022-08-15

## 2022-08-15 RX ORDER — UBROGEPANT 100 MG/1
100 TABLET ORAL ONCE AS NEEDED
Qty: 2 TABLET | Refills: 0 | COMMUNITY
Start: 2022-08-15 | End: 2022-11-03

## 2022-08-15 RX ORDER — FLUOXETINE 10 MG/1
10 CAPSULE ORAL DAILY
Qty: 30 CAPSULE | Refills: 3 | Status: SHIPPED | OUTPATIENT
Start: 2022-08-15 | End: 2022-11-03 | Stop reason: ALTCHOICE

## 2022-08-15 ASSESSMENT — ENCOUNTER SYMPTOMS
COUGH: 0
VOMITING: 0
ABDOMINAL PAIN: 0
COUGH: 0
DIARRHEA: 0
NAIL CHANGES: 0
NAUSEA: 0
WHEEZING: 0
EYE PAIN: 0
RHINORRHEA: 0
ABDOMINAL PAIN: 0
STRIDOR: 0
APHONIA: 0
SINUS PRESSURE: 1
SORE THROAT: 0
SHORTNESS OF BREATH: 0

## 2022-08-15 ASSESSMENT — PATIENT HEALTH QUESTIONNAIRE - PHQ9
SUM OF ALL RESPONSES TO PHQ QUESTIONS 1-9: 0
1. LITTLE INTEREST OR PLEASURE IN DOING THINGS: 0
2. FEELING DOWN, DEPRESSED OR HOPELESS: 0
SUM OF ALL RESPONSES TO PHQ9 QUESTIONS 1 & 2: 0
SUM OF ALL RESPONSES TO PHQ QUESTIONS 1-9: 0

## 2022-08-15 NOTE — PROGRESS NOTES
Nasir Deleon (:  1995) is a 32 y.o. female,Established patient, here for evaluation of the following chief complaint(s):  Dizziness (Light headed, for the past 5 weeks every day.)         ASSESSMENT/PLAN:  1. Palpitation  -     D-Dimer, Quantitative; Future  2. Intractable migraine without aura and with status migrainosus  -     Ubrogepant (UBRELVY) 100 MG TABS; Take 100 mg by mouth 1 (one) time if needed (migraine), Disp-2 tablet, R-0Sample  3. Adjustment disorder with anxious mood  -     FLUoxetine (PROZAC) 10 MG capsule; Take 1 capsule by mouth in the morning., Disp-30 capsule, R-3Normal  Trial prozac low dose to help with this prolonged illness and the mental strain it is causing. May also help with post covid? Will continue to pursue vestibular rehab and follow allong with cardiology re: recs for her tachycardia. Did a d dimer today. No follow-ups on file. Subjective   SUBJECTIVE/OBJECTIVE:  HPI  Chief Complaint   Patient presents with    Dizziness     Light headed, for the past 5 weeks every day. Has been seen by me and multiple other doctors several times in the last few weeks for dizziness, lightheadedness, headache, ear pain, fatigue, and palpitations. This seemed to start after disembarking an airplane and has become rather debilitating. 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 She is tearful in telling me she feels poorly every day and is struggling to do her job but afraid she will lose it if she is sick anymore. She feels a constant underlying lightheadedness w/ some intermittent headaches over the left eye which are sometimes associated with left ear/jaw pain. She only gets relief when she sleeps. Cardiology has her being monitored for now. Suspect post covid IST. May put her back on corlanor.    Neuro would like for her to do vestibular rehab but she states that her insurance won't pay for the hundreds of dollars required to do it with [facility] as the hospital fees are not covered. She has asked us to reprocess the referral to an outside PT facility to allow her to pay just her copay. Review of Systems   Constitutional:  Negative for chills, diaphoresis, fatigue and fever. HENT:  Positive for congestion, ear pain and sinus pressure. Negative for postnasal drip, rhinorrhea, sneezing and sore throat. Respiratory:  Negative for cough, shortness of breath and wheezing. Cardiovascular:  Negative for chest pain and palpitations. Gastrointestinal:  Negative for abdominal pain, diarrhea, nausea and vomiting. Musculoskeletal:  Positive for myalgias. Skin:  Negative for rash. Neurological:  Positive for dizziness, light-headedness and headaches. Hematological:  Negative for adenopathy. Psychiatric/Behavioral:  Positive for dysphoric mood. Objective   Physical Exam  Vitals reviewed. Constitutional:       Appearance: Normal appearance. She is obese. HENT:      Head: Normocephalic. Right Ear: Tympanic membrane normal.      Left Ear: A middle ear effusion (small clear) is present. Tympanic membrane is not erythematous, retracted or bulging. Eyes:      Extraocular Movements: Extraocular movements intact. Conjunctiva/sclera: Conjunctivae normal.      Pupils: Pupils are equal, round, and reactive to light. Cardiovascular:      Rate and Rhythm: Normal rate and regular rhythm. Heart sounds: Normal heart sounds. No murmur heard. Pulmonary:      Effort: Pulmonary effort is normal. No respiratory distress. Breath sounds: Normal breath sounds. Abdominal:      General: Bowel sounds are normal.      Palpations: Abdomen is soft. Musculoskeletal:         General: Normal range of motion. Skin:     General: Skin is warm and dry. Neurological:      General: No focal deficit present. Mental Status: She is alert.    Psychiatric:         Attention and Perception: Attention normal.         Mood and Affect: Mood is

## 2022-08-15 NOTE — PROGRESS NOTES
[address] [place], [place]  [address], [address], [address] [place] [address]  PHONE: [phone]    SUBJECTIVE:   Krystian Taveras is a 32 y.o. female 1995   seen for a follow up visit regarding the following:     Chief Complaint   Patient presents with    Shortness of Breath    Follow-up    Dizziness         History of present illness: 32 y.o. female presented for follow-up 8/15/22 COVID infection Aug 2022. Had Sameer has light headed palpations with minimal activity. Driving with episode of palpations and heart rates last week. Patient described dizziness that is recurrent as well. Symptoms have worsened following COVID-19 infection but some symptoms preceding this. Previously patient had trial of ivabradine with effectiveness. She discontinued this in had improvement of symptoms following treatment for a short course. Patient working as a traveling nurse ICU and critical care       Interval Hx:      Patient with previous history of COVID infection. The patient worked as a nurse in the [address] [place] [address] ICU. She had a long course of COVID with some respiratory events, but mostly back pain. Subsequent myalgias. She received steroid therapy with improvement  of symptoms originally. At the time of appointment 11/06/2020, the patient described episodes of shortness of breath, as well as rapid heart rates when taking part in minimal physical activities. Her last COVID negative test was 11/05/2020 per the patient. Cardiac History:      ECG 11/06/2020 - sinus rhythm, RSR V1, voltage criteria for LVH, abnormal precordial contours.      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 Subsequent infection with recurrence of symptoms now worse with dizziness  Contraceptive planning  Would need to be revisited if pharmacotherapies for ivabradine are reinstituted  Tachycardia, uncontrolled. May fit criteria for IST. Review of laboratory studies is normal, hemoglobin, TSH. Diltiazem is not an option due to hypotension. Patient unable to tolerate this or beta-blocker    Trial of ivabradine in the past.             Current Outpatient Medications   Medication Sig    Prenatal MV-Min-Fe Fum-FA-DHA (PRENATAL 1 PO) Take by mouth    Bacillus Coagulans-Inulin (PROBIOTIC) 1-250 BILLION-MG CAPS Take by mouth    acetaminophen (TYLENOL) 325 MG tablet Take 650 mg by mouth every 6 hours as needed for Pain    ibuprofen (ADVIL;MOTRIN) 200 MG tablet Take 200 mg by mouth every 6 hours as needed for Pain    FLUoxetine (PROZAC) 10 MG capsule Take 1 capsule by mouth in the morning. Ubrogepant (UBRELVY) 100 MG TABS Take 100 mg by mouth 1 (one) time if needed (migraine)    meclizine (ANTIVERT) 25 MG tablet Take 25 mg by mouth every 6 hours as needed (Patient not taking: No sig reported)     No current facility-administered medications for this visit. Past Medical History, Past Surgical History, Family history, Social History, and Medications were all reviewed with the patient today and updated as necessary.        Allergies   Allergen Reactions    Shellfish-Derived Products Nausea And Vomiting     Past Medical History:   Diagnosis Date    Urinary tract infection, site not specified     Vesicoureteral reflux, unspecified or without reflux nephropathy 1/7/2014     Past Surgical History:   Procedure Laterality Date    UROLOGICAL SURGERY       Family History   Problem Relation Age of Onset    Hypertension Mother     Anemia Mother     Diabetes Father     Hypertension Father       Social History     Tobacco Use    Smoking status: Never    Smokeless tobacco: Never   Substance Use Topics    Alcohol use: No       ROS:    Review of Systems   Constitutional: Negative for fever. HENT:  Negative for stridor. Eyes:  Negative for pain. Cardiovascular:  Negative for chest pain. Respiratory:  Negative for cough. Endocrine: Negative for cold intolerance. Skin:  Negative for nail changes. Musculoskeletal:  Negative for arthritis. Gastrointestinal:  Negative for abdominal pain. Genitourinary:  Negative for dysuria. Neurological:  Negative for aphonia. Psychiatric/Behavioral:  Negative for altered mental status. Allergic/Immunologic: Negative for hives. PHYSICAL EXAM:    /78   Pulse 60   Ht 5' 6\" (1.676 m)   Wt 167 lb (75.8 kg)   LMP 07/28/2022   BMI 26.95 kg/m²        Wt Readings from Last 3 Encounters:   08/15/22 167 lb (75.8 kg)   08/15/22 167 lb (75.8 kg)   07/25/22 165 lb (74.8 kg)     BP Readings from Last 3 Encounters:   08/15/22 119/77   08/15/22 122/78   07/25/22 108/72         Physical Exam  Vitals reviewed. HENT:      Head: Normocephalic. Right Ear: External ear normal.      Left Ear: External ear normal.      Nose: Nose normal.   Eyes:      General: No scleral icterus. Pulmonary:      Effort: Pulmonary effort is normal.   Abdominal:      General: There is no distension. Musculoskeletal:      Cervical back: Neck supple. Skin:     General: Skin is warm. Neurological:      Mental Status: She is alert. Mental status is at baseline. Medical problems and test results were reviewed with the patient today.            Recent Results (from the past 672 hour(s))   Cardiac event monitor    Collection Time: 08/15/22  4:43 PM   Result Value Ref Range    Body Surface Area 1.88 m2     Lab Results Component Value Date/Time    CHOL 209 07/21/2021 08:47 AM    HDL 72 07/21/2021 08:47 AM    VLDL 28 10/26/2020 02:05 PM              PLAN  Angel Mesa was seen today for shortness of breath, follow-up and dizziness. Diagnoses and all orders for this visit:    Tachycardia, unspecified  -     D-Dimer, Quantitative; Future    Shortness of breath    Tachycardia  -     Cardiac event monitor; Future    Return in about 3 months (around 11/15/2022).        Yuri Tracy MD  8/15/2022  7:10 PM

## 2022-08-16 ENCOUNTER — TELEPHONE (OUTPATIENT)
Dept: FAMILY MEDICINE CLINIC | Facility: CLINIC | Age: 27
End: 2022-08-16

## 2022-08-16 NOTE — TELEPHONE ENCOUNTER
----- Message from Sheridan Donis sent at 8/16/2022 10:28 AM EDT -----  Subject: Referral Request    Reason for referral request? Pt called to provide the updated contact info   for a pt office that takes her insurance vestibular therapy [facility] pt in   [place] [address] [phone] [phone]  Provider patient wants to be referred to(if known):     Provider Phone Number(if known):     Additional Information for Provider?   ---------------------------------------------------------------------------  --------------  [facility]    [phone]; OK to leave message on voicemail, OK to respond with   electronic message via [employer] portal (only for patients who have   registered [employer] account)  ---------------------------------------------------------------------------  --------------

## 2022-08-16 NOTE — TELEPHONE ENCOUNTER
----- Message from Jessica Braxton sent at 8/16/2022 10:32 AM EDT -----  Subject: Message to Provider    QUESTIONS  Information for Provider? pt would like to know if her mom can  her   medical records later this week?  She would also like to have them faxed to   [address] [place] [address] Way as well phone# [phone] fax # [phone]  ---------------------------------------------------------------------------  --------------  [facility] [facility]  [phone]; OK to leave message on voicemail, OK to respond with   electronic message via [employer] portal (only for patients who have   registered [employer] account)  ---------------------------------------------------------------------------  --------------  SCRIPT ANSWERS  undefined

## 2022-08-30 ENCOUNTER — TELEPHONE (OUTPATIENT)
Dept: FAMILY MEDICINE CLINIC | Facility: CLINIC | Age: 27
End: 2022-08-30

## 2022-08-30 DIAGNOSIS — R42 DIZZINESS: ICD-10-CM

## 2022-08-30 DIAGNOSIS — R00.2 PALPITATION: ICD-10-CM

## 2022-08-30 NOTE — TELEPHONE ENCOUNTER
----- Message from Chet Butts sent at 8/30/2022 12:20 PM EDT -----  Subject: Message to Provider    QUESTIONS  Information for Provider? Pt calling to schedule her B12 lab work, order   is in and pt waiting for office to call and schedule.  Please call to   schedule with pt.  ---------------------------------------------------------------------------  --------------  Ky Fraction [facility]  [phone]; OK to leave message on voicemail  ---------------------------------------------------------------------------  --------------  SCRIPT ANSWERS  undefined

## 2022-08-31 LAB — VIT B12 SERPL-MCNC: 422 PG/ML (ref 193–986)

## 2022-09-19 ENCOUNTER — OFFICE VISIT (OUTPATIENT)
Dept: OBGYN CLINIC | Age: 27
End: 2022-09-19
Payer: COMMERCIAL

## 2022-09-19 VITALS
BODY MASS INDEX: 27.15 KG/M2 | SYSTOLIC BLOOD PRESSURE: 108 MMHG | DIASTOLIC BLOOD PRESSURE: 62 MMHG | HEIGHT: 67 IN | WEIGHT: 173 LBS

## 2022-09-19 DIAGNOSIS — R68.89 LIGHT SENSITIVITY: ICD-10-CM

## 2022-09-19 DIAGNOSIS — R42 LIGHTHEADED: ICD-10-CM

## 2022-09-19 DIAGNOSIS — R42 DIZZINESS: Primary | ICD-10-CM

## 2022-09-19 DIAGNOSIS — N92.1 PROLONGED MENSTRUAL CYCLE: ICD-10-CM

## 2022-09-19 DIAGNOSIS — R11.0 NAUSEA: ICD-10-CM

## 2022-09-19 PROCEDURE — 99213 OFFICE O/P EST LOW 20 MIN: CPT | Performed by: OBSTETRICS & GYNECOLOGY

## 2022-09-19 RX ORDER — ONDANSETRON 4 MG/1
TABLET, FILM COATED ORAL
COMMUNITY
Start: 2022-08-03

## 2022-09-19 ASSESSMENT — ENCOUNTER SYMPTOMS
COUGH: 0
CONSTIPATION: 0
SHORTNESS OF BREATH: 0
VOMITING: 0
DIARRHEA: 0

## 2022-09-19 NOTE — PROGRESS NOTES
Patient presents today for   Chief Complaint   Patient presents with    Follow-up     Daily nausea, lightheadedness since July. Has seen neuro, ENT, ER.       LMP: Patient's last menstrual period was 09/02/2022 (exact date). Contraception: none        Allergies   Allergen Reactions    Shellfish-Derived Products Nausea And Vomiting     Current Outpatient Medications   Medication Sig Dispense Refill    ondansetron (ZOFRAN) 4 MG tablet       Prenatal MV-Min-Fe Fum-FA-DHA (PRENATAL 1 PO) Take by mouth      Bacillus Coagulans-Inulin (PROBIOTIC) 1-250 BILLION-MG CAPS Take by mouth      acetaminophen (TYLENOL) 325 MG tablet Take 650 mg by mouth every 6 hours as needed for Pain      amitriptyline (ELAVIL) 10 MG tablet Take 1 tablet by mouth nightly 30 tablet 1    ibuprofen (ADVIL;MOTRIN) 200 MG tablet Take 200 mg by mouth every 6 hours as needed for Pain      FLUoxetine (PROZAC) 10 MG capsule Take 1 capsule by mouth in the morning. 30 capsule 3    Ubrogepant (UBRELVY) 100 MG TABS Take 100 mg by mouth 1 (one) time if needed (migraine) 2 tablet 0    meclizine (ANTIVERT) 25 MG tablet Take 25 mg by mouth every 6 hours as needed       No current facility-administered medications for this visit.      Past Medical History:   Diagnosis Date    Urinary tract infection, site not specified     Vesicoureteral reflux, unspecified or without reflux nephropathy 1/7/2014     Past Surgical History:   Procedure Laterality Date    UROLOGICAL SURGERY       Social History     Socioeconomic History    Marital status:      Spouse name: Not on file    Number of children: Not on file    Years of education: Not on file    Highest education level: Not on file   Occupational History    Not on file   Tobacco Use    Smoking status: Never    Smokeless tobacco: Never   Vaping Use    Vaping Use: Never used   Substance and Sexual Activity    Alcohol use: No    Drug use: No    Sexual activity: Yes     Partners: Male     Birth control/protection: Pill Other Topics Concern    Not on file   Social History Narrative    Not on file     Social Determinants of Health     Financial Resource Strain: Not on file   Food Insecurity: Not on file   Transportation Needs: Not on file   Physical Activity: Not on file   Stress: Not on file   Social Connections: Not on file   Intimate Partner Violence: Not on file   Housing Stability: Not on file     Family History   Problem Relation Age of Onset    Hypertension Mother     Anemia Mother     Diabetes Father     Hypertension Father      /62   Ht 5' 7\" (1.702 m)   Wt 173 lb (78.5 kg)   LMP 09/02/2022 (Exact Date)   BMI 27.10 kg/m²      Review of Systems   Constitutional: Negative. HENT: Negative. Eyes: Negative. Respiratory: Negative. Cardiovascular: Negative. Gastrointestinal:  Positive for nausea. Endocrine: Negative. Genitourinary: Negative. Musculoskeletal: Negative. Allergic/Immunologic: Negative. Neurological:  Positive for dizziness and light-headedness. Hematological: Negative. Psychiatric/Behavioral: Negative. Physical Exam  Vitals reviewed. Constitutional:       General: She is not in acute distress. Appearance: Normal appearance. She is well-developed. She is not ill-appearing, toxic-appearing or diaphoretic. HENT:      Head: Normocephalic and atraumatic. Eyes:      General: No scleral icterus. Conjunctiva/sclera: Conjunctivae normal.   Pulmonary:      Effort: Pulmonary effort is normal. No respiratory distress. Abdominal:      General: There is no distension. Musculoskeletal:         General: Normal range of motion. Cervical back: Normal range of motion and neck supple. Skin:     General: Skin is warm and dry. Coloration: Skin is not jaundiced or pale. Findings: No erythema or rash. Neurological:      General: No focal deficit present. Mental Status: She is alert, oriented to person, place, and time and easily aroused. Coordination: Coordination normal.   Psychiatric:         Mood and Affect: Mood normal.         Speech: Speech normal.         Behavior: Behavior normal. Behavior is cooperative. Thought Content: Thought content normal.         Judgment: Judgment normal.     1. Dizziness    2. Nausea    3. Lightheaded    4. Prolonged menstrual cycle    5. Light sensitivity      Pt seen today to discuss her recent dx of vestibular migraines. Pt states she was given that dx at [facility] but has not had a hx of migraines previously. Had some stress induced headaches in college but has not had since then. Pt reports that all of her symptoms began July 10th of this year when she got sick and was nauseous feeling on a flight back from [place] with her family. Reports no one else got sick, no one on the flight appeared ill and she did not feel bad at any point before getting on the flight. Pt reports that she has not felt better since that time. 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 She has been experiencing light sensitivity as well. States her eyelids almost close due to it. Reviewed my concern that she could have something vague like MS, etc - pt states she had Cts, MRIs, etc and nothing abnormal has been seen. Pt has noted her cycles progressed from 28 days to 35 day cycles and states bleeding lasts a full week. Pt wondered if this could have anything to do with her symptoms. I do not feel that her cycles could be associated but she may be having prolonged cycles due to stress from whatever is causing all of this. Recommend monitoring for now. Greater than 20 minutes spent on face to face counseling, education, and examining the patient regarding her exam findings, indications for further tests, and discussion of assessment and plan as stated above. Pt also seen by Dr. Eunice Torres for palpitations, see cardiology notes.      Return if symptoms worsen or fail to improve,

## 2022-09-19 NOTE — PROGRESS NOTES
Bisi Mcelroy (:  1995) is a 32 y.o. female,Established patient, here for evaluation of the following chief complaint(s):  Follow-up (On Palpitations and Migraines. Is not taking the Prozac for the past 2 weeks. She didn't like the way she felt on it. Wants to see if there are any other things that may be wrong, or any other specalist that she may need to see) and Fatigue (Wants to know if it may be related to her sleep. She does snore and wakes up tired.)         ASSESSMENT/PLAN:  1. Chronic intractable headache, unspecified headache type  -     amitriptyline (ELAVIL) 10 MG tablet; Take 1 tablet by mouth nightly, Disp-30 tablet, R-1Normal  2. Exophthalmos  -     Thyroid Stimulating Immunoglobulin; Future  -     TSH; Future  3. Dizziness  -     amitriptyline (ELAVIL) 10 MG tablet; Take 1 tablet by mouth nightly, Disp-30 tablet, R-1Normal  4. Snoring  -     [address] [place] [address]  5. Non-restorative sleep  -     [address] [place] [address]  Try amitriptyline which may help w/ chronic myalgias, exhaustion, and headaches/dizziness, as well as anxiety. Also ordered a sleep study due to her concerns of snoring w/ sleep and non restorative sleep. Will also check thyroid studies and tsi as she has quite prominent eyes. No follow-ups on file. Subjective   SUBJECTIVE/OBJECTIVE:  HPI  Patient here to f/u on post covid symptoms. Started prozac 10mg at her last visit. She was also referred for neuro eval and vesticular rehabs. Today reports. .. Chief Complaint   Patient presents with    Follow-up     On Palpitations and Migraines. Is not taking the Prozac for the past 2 weeks. She didn't like the way she felt on it. Wants to see if there are any other things that may be wrong, or any other specalist that she may need to see    Fatigue     Wants to know if it may be related to her sleep. She does snore and wakes up tired.      She stopped the prozac, ubrelvy, and meclizine. Is not taking any daily meds. Saw [facility] neurology and was told it could be vestibular migraine and that the prozac was the drug of choice for this. They also weren't sure why the episodes are lasting months vs. 72 hours. Prozac made her feel very nauseous worse than her prior. Labs done 7/18 showed leukocyosis and urine had a small amt of blood. She had been on steroids. Has now seen ob, cardiology, vestibular therapy, ent, neurology. Has been doing vestibular therapy for 4 weeks w/ no improvement. Review of Systems   Constitutional:  Positive for fatigue. Negative for diaphoresis and unexpected weight change. HENT:  Negative for congestion. Eyes:  Negative for visual disturbance. Respiratory:  Negative for cough and shortness of breath. Cardiovascular:  Negative for chest pain. Gastrointestinal:  Negative for constipation, diarrhea and vomiting. Genitourinary:  Negative for dysuria. Neurological:  Positive for dizziness and light-headedness. Negative for headaches. Psychiatric/Behavioral:  Positive for sleep disturbance. Negative for dysphoric mood. The patient is not nervous/anxious. Objective   Physical Exam  Vitals reviewed. Constitutional:       Appearance: Normal appearance. HENT:      Head: Normocephalic. Eyes:      Extraocular Movements: Extraocular movements intact. Conjunctiva/sclera: Conjunctivae normal.      Pupils: Pupils are equal, round, and reactive to light. Cardiovascular:      Rate and Rhythm: Normal rate and regular rhythm. Heart sounds: Normal heart sounds. No murmur heard. Pulmonary:      Effort: Pulmonary effort is normal. No respiratory distress. Breath sounds: Normal breath sounds. Abdominal:      General: Bowel sounds are normal.      Palpations: Abdomen is soft. Musculoskeletal:         General: Normal range of motion. Skin:     General: Skin is warm and dry.    Neurological: General: No focal deficit present. Mental Status: She is alert. Psychiatric:         Mood and Affect: Mood normal. Affect is tearful (when discussing how she just wants to feel like she felt before. ). Behavior: Behavior normal.   /70 (Site: Right Upper Arm, Position: Sitting, Cuff Size: Medium Adult)   Pulse 78   Temp 96.9 °F (36.1 °C)   Resp 20   Ht 5' 7\" (1.702 m)   Wt 173 lb (78.5 kg)   LMP 09/02/2022 (Exact Date)   SpO2 97%   BMI 27.10 kg/m²        An electronic signature was used to authenticate this note.     --Yandel Doherty MD

## 2022-09-20 ENCOUNTER — OFFICE VISIT (OUTPATIENT)
Dept: ENT CLINIC | Age: 27
End: 2022-09-20
Payer: COMMERCIAL

## 2022-09-20 ENCOUNTER — OFFICE VISIT (OUTPATIENT)
Dept: FAMILY MEDICINE CLINIC | Facility: CLINIC | Age: 27
End: 2022-09-20
Payer: COMMERCIAL

## 2022-09-20 VITALS
DIASTOLIC BLOOD PRESSURE: 70 MMHG | WEIGHT: 173 LBS | SYSTOLIC BLOOD PRESSURE: 109 MMHG | HEIGHT: 67 IN | RESPIRATION RATE: 20 BRPM | OXYGEN SATURATION: 97 % | TEMPERATURE: 96.9 F | HEART RATE: 78 BPM | BODY MASS INDEX: 27.15 KG/M2

## 2022-09-20 VITALS — BODY MASS INDEX: 27.15 KG/M2 | RESPIRATION RATE: 18 BRPM | HEIGHT: 67 IN | WEIGHT: 173 LBS

## 2022-09-20 DIAGNOSIS — H05.20 EXOPHTHALMOS: ICD-10-CM

## 2022-09-20 DIAGNOSIS — R42 DIZZINESS: ICD-10-CM

## 2022-09-20 DIAGNOSIS — R51.9 CHRONIC INTRACTABLE HEADACHE, UNSPECIFIED HEADACHE TYPE: Primary | ICD-10-CM

## 2022-09-20 DIAGNOSIS — R06.83 SNORING: ICD-10-CM

## 2022-09-20 DIAGNOSIS — R42 DIZZINESS: Primary | ICD-10-CM

## 2022-09-20 DIAGNOSIS — G89.29 CHRONIC INTRACTABLE HEADACHE, UNSPECIFIED HEADACHE TYPE: Primary | ICD-10-CM

## 2022-09-20 DIAGNOSIS — G47.8 NON-RESTORATIVE SLEEP: ICD-10-CM

## 2022-09-20 PROCEDURE — 99214 OFFICE O/P EST MOD 30 MIN: CPT | Performed by: OTOLARYNGOLOGY

## 2022-09-20 PROCEDURE — 99214 OFFICE O/P EST MOD 30 MIN: CPT | Performed by: FAMILY MEDICINE

## 2022-09-20 RX ORDER — AMITRIPTYLINE HYDROCHLORIDE 10 MG/1
10 TABLET, FILM COATED ORAL NIGHTLY
Qty: 30 TABLET | Refills: 1 | Status: SHIPPED | OUTPATIENT
Start: 2022-09-20 | End: 2022-11-03 | Stop reason: ALTCHOICE

## 2022-09-20 ASSESSMENT — PATIENT HEALTH QUESTIONNAIRE - PHQ9
2. FEELING DOWN, DEPRESSED OR HOPELESS: 1
SUM OF ALL RESPONSES TO PHQ9 QUESTIONS 1 & 2: 1
SUM OF ALL RESPONSES TO PHQ QUESTIONS 1-9: 1
1. LITTLE INTEREST OR PLEASURE IN DOING THINGS: 0
SUM OF ALL RESPONSES TO PHQ QUESTIONS 1-9: 1

## 2022-09-20 ASSESSMENT — ENCOUNTER SYMPTOMS
ABDOMINAL PAIN: 0
SHORTNESS OF BREATH: 0

## 2022-09-20 NOTE — PROGRESS NOTES
Chief Complaint   Patient presents with    Follow-up     Patient states that she still having vertigo symptoms and is here for a second opinion. HPI:  Dima Batista is a 32 y.o. female seen in follow-up for dizziness. She had previously seen my partner Dr. Cortes Agudelo earlier this summer. Since July 10th, she has been having daily dizziness/lightheadedness and even nausea. She was on a flight when sxs first started- she felt acutely nauseated, dizzy, and lightheaded. She first thought that it was just motion sickness- she air travels freq and this has never been problem in past. Her dizziness and nausea persisted and she has since developed intermittent bouts of worsened sxs- occurring up to 2-3 times daily initially. During these bouts, she would have 2-3 hrs of worse dizziness, fatigue, a \"floating\" feeling in her head, heavy eyes and even feel like she was going to pass out. She has never fallen or had syncopal episode. She gets freq HA's as well- this has been problem for yrs- thought to be stress-related HA's when she was in college. No other associated neurologic sxs, but she does get light sensitivity, gayle when her sxs are bad. These bad bouts used to occur more frequently- now occurring about once a day. 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 She been taking OTC analgesics and has been on 2 diff abortive migraine meds w/ little relief. She had a head CT and MRI at [facility] which were both clear. She does have some random intermittent pains in her L ear- lasting for minutes- no otorrhea at all. There has not been any change in hearing during this time and she has no previous otologic hx. Past Medical History, Past Surgical History, Family history, Social History, and Medications were all reviewed with the patient today and updated as necessary.      Allergies   Allergen Reactions    Shellfish-Derived Products Nausea And Vomiting     Patient Active Problem List   Diagnosis    Chronic headache    Intractable migraine without aura    Colicky abdominal pain    Iron deficiency anemia    Change in bowel habits    Diarrhea    Maxillary sinus cyst    History of 2019 novel coronavirus disease (COVID-19)    Chronic fatigue    Palpitation    Vitamin D deficiency    Allergic rhinitis    Dysfunction of both eustachian tubes    Dizziness     Current Outpatient Medications   Medication Sig    amitriptyline (ELAVIL) 10 MG tablet Take 1 tablet by mouth nightly    ondansetron (ZOFRAN) 4 MG tablet     Prenatal MV-Min-Fe Fum-FA-DHA (PRENATAL 1 PO) Take by mouth    Bacillus Coagulans-Inulin (PROBIOTIC) 1-250 BILLION-MG CAPS Take by mouth    acetaminophen (TYLENOL) 325 MG tablet Take 650 mg by mouth every 6 hours as needed for Pain    ibuprofen (ADVIL;MOTRIN) 200 MG tablet Take 200 mg by mouth every 6 hours as needed for Pain    FLUoxetine (PROZAC) 10 MG capsule Take 1 capsule by mouth in the morning. Ubrogepant (UBRELVY) 100 MG TABS Take 100 mg by mouth 1 (one) time if needed (migraine)    meclizine (ANTIVERT) 25 MG tablet Take 25 mg by mouth every 6 hours as needed     No current facility-administered medications for this visit. Past Medical History:   Diagnosis Date    Urinary tract infection, site not specified     Vesicoureteral reflux, unspecified or without reflux nephropathy 1/7/2014     Social History     Tobacco Use    Smoking status: Never    Smokeless tobacco: Never   Substance Use Topics    Alcohol use: No     Past Surgical History:   Procedure Laterality Date    UROLOGICAL SURGERY       Family History   Problem Relation Age of Onset    Hypertension Mother     Anemia Mother     Diabetes Father     Hypertension Father         ROS:    Review of Systems   Constitutional:  Negative for fever. Eyes:  Negative for visual disturbance. Respiratory:  Negative for shortness of breath. Cardiovascular:  Negative for chest pain. Gastrointestinal:  Negative for abdominal pain. Skin:  Negative for rash. Neurological:  Negative for dizziness and headaches. Hematological:  Negative for adenopathy. Psychiatric/Behavioral:  Negative for agitation. PHYSICAL EXAM:    Resp 18   Ht 5' 7\" (1.702 m)   Wt 173 lb (78.5 kg)   LMP 09/02/2022 (Exact Date)   BMI 27.10 kg/m²     General: NAD, well-appearing  Neuro: No gross neuro deficits. No facial weakness. Eyes: No periorbital edema/ecchymosis. No nystagmus. Skin: No facial erythema, rashes or concerning lesions. Nose: No external deviations or saddling. Intranasally, septum is midline without perforations, nasal mucosa appears healthy with no erythema, mucopurulence, or polyps. Mouth: Moist mucus membranes, normal tongue/palate mobility, no concerning mucosal lesions. Oropharynx clear with no erythema/exudate, no tonsillar hypertrophy. Ears: Normal appearing auricles, no hematomas. EACs clear with no cerumen impaction, healthy canal skin, TM's intact with no perforations or retraction pockets. No middle ear effusions. Neck: Soft, supple, no palpable neck masses. No palpable parotid or submandibular masses. No thyromegaly or palpable thyroid nodules. Lymphatics: No palpable cervical LAD. Resp: No audible stridor or wheezing. CV: No murmurs, no JVD. Extremities: No clubbing or cyanosis. ASSESSMENT and PLAN      ICD-10-CM    1. Dizziness  R42           Her ears were clear and healthy on my exam today. She may have suffered an episode of vestibular neuritis (VN) back in July, but her sxs do sound more neurologic in nature, gayle w/ her HA and photophobia. She could have a combination or VN and migraine associated dizziness. She has been doing vestibular rehab and this really is her best option from an inner ear standpoint at this time. I encouraged increased hydration and also low salt diet. I reassured her of my findings and will see her back PRN.     Santa Queen, MD  9/20/2022    Electronically signed by Barby Geronimo MD on 9/20/2022 at 7:00 PM

## 2022-09-21 LAB — TSH, 3RD GENERATION: 0.66 UIU/ML (ref 0.36–3.74)

## 2022-09-22 LAB — TSI ACT/NOR SER: <0.1 IU/L (ref 0–0.55)

## 2022-09-29 ASSESSMENT — ENCOUNTER SYMPTOMS
ALLERGIC/IMMUNOLOGIC NEGATIVE: 1
EYES NEGATIVE: 1
RESPIRATORY NEGATIVE: 1
NAUSEA: 1

## 2022-11-02 ENCOUNTER — TELEPHONE (OUTPATIENT)
Dept: CARDIOLOGY CLINIC | Age: 27
End: 2022-11-02

## 2022-11-02 NOTE — TELEPHONE ENCOUNTER
Pt reports episode of HR racing 131 bpm  x2 yesterday. Sx improved with rest. But c/o residual chest soreness 45 min-1 hr post.   Denies cp today  No caffeine yesterday and hydrates with approx 65 oz water daily. Pt wore monitor 8/15-8/28/22. Would like results. [facility] f/u Thurs 11/3/22 1p Dr. Nithin V[place]. Advise ER for /< not relieved with rest or worsening sx. Pt voiced understanding and agreement with POC.   cgh

## 2022-11-02 NOTE — TELEPHONE ENCOUNTER
Yesterday, pt states she had two episodes where it felt like her heart was going to pound out of her chest. States she was walking at work and her chest started hurting and she checked her HR and it was around 131 bpm. States it left her with lingering CP for about an hour afterwards. Denies current CP and SOB. Pt would like to know if this is something to be concerned about.

## 2022-11-03 ENCOUNTER — OFFICE VISIT (OUTPATIENT)
Dept: CARDIOLOGY CLINIC | Age: 27
End: 2022-11-03
Payer: COMMERCIAL

## 2022-11-03 VITALS
DIASTOLIC BLOOD PRESSURE: 60 MMHG | SYSTOLIC BLOOD PRESSURE: 92 MMHG | HEIGHT: 67 IN | BODY MASS INDEX: 26.82 KG/M2 | WEIGHT: 170.9 LBS | HEART RATE: 77 BPM

## 2022-11-03 DIAGNOSIS — R00.0 TACHYCARDIA, UNSPECIFIED: Primary | ICD-10-CM

## 2022-11-03 DIAGNOSIS — R00.0 TACHYCARDIA: ICD-10-CM

## 2022-11-03 DIAGNOSIS — R06.02 SHORTNESS OF BREATH: ICD-10-CM

## 2022-11-03 PROCEDURE — 99214 OFFICE O/P EST MOD 30 MIN: CPT | Performed by: INTERNAL MEDICINE

## 2022-11-03 PROCEDURE — 93000 ELECTROCARDIOGRAM COMPLETE: CPT | Performed by: INTERNAL MEDICINE

## 2022-11-03 ASSESSMENT — ENCOUNTER SYMPTOMS
COUGH: 0
APHONIA: 0
NAIL CHANGES: 0
ABDOMINAL PAIN: 0
EYE PAIN: 0
STRIDOR: 0

## 2022-11-03 NOTE — PROGRESS NOTES
[address]  [address], [address], [address] [place] [address]  PHONE: [phone]    SUBJECTIVE:   Delano Tafoya is a 32 y.o. female 1995   seen for a follow up visit regarding the following:     Chief Complaint   Patient presents with    Irregular Heart Beat    3 Month Follow-Up         History of present illness: 32 y.o. female presented for follow-up 11/3/22 Dizziness due to binocular vision disorder. 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 Interval history:  COVID infection Aug 2022. Had Sameer has light headed palpations with minimal activity. Driving with episode of palpations and heart rates last week. Patient described dizziness that is recurrent as well. Symptoms have worsened following COVID-19 infection but some symptoms preceding this. Previously patient had trial of ivabradine with effectiveness. She discontinued this in had improvement of symptoms following treatment for a short course. Patient working as a traveling nurse ICU and critical care       Interval Hx:      Patient with previous history of COVID infection. 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 She had a long course of COVID with some respiratory events, but mostly back pain. Subsequent myalgias. She received steroid therapy with improvement  of symptoms originally. At the time of appointment 11/06/2020, the patient described episodes of shortness of breath, as well as rapid heart rates when taking part in minimal physical activities. Her last COVID negative test was 11/05/2020 per the patient. Cardiac History:      ECG 11/06/2020 - sinus rhythm, RSR V1, voltage criteria for LVH, abnormal precordial contours.      2020 echocardiogram normal for age RVSP 60 mmHg    Cardiac monitor no arrhythmia minimum heart rate 51 bpm max 149 bpm average heart rate 93 bpm  11/3/2022 EKG sinus rhythm left ventricular hypertrophy      Assessment and Plan:    Dizziness  Not cardiac related    Tachycardia, Has been on ivabrdine in the past   Intermittent palpations . Current Outpatient Medications   Medication Sig    ondansetron (ZOFRAN) 4 MG tablet     Prenatal MV-Min-Fe Fum-FA-DHA (PRENATAL 1 PO) Take by mouth    Bacillus Coagulans-Inulin (PROBIOTIC) 1-250 BILLION-MG CAPS Take by mouth    acetaminophen (TYLENOL) 325 MG tablet Take 650 mg by mouth every 6 hours as needed for Pain    ibuprofen (ADVIL;MOTRIN) 200 MG tablet Take 200 mg by mouth every 6 hours as needed for Pain     No current facility-administered medications for this visit. Past Medical History, Past Surgical History, Family history, Social History, and Medications were all reviewed with the patient today and updated as necessary. Allergies   Allergen Reactions    Shellfish-Derived Products Nausea And Vomiting     Past Medical History:   Diagnosis Date    Urinary tract infection, site not specified     Vesicoureteral reflux, unspecified or without reflux nephropathy 1/7/2014     Past Surgical History:   Procedure Laterality Date    UROLOGICAL SURGERY       Family History   Problem Relation Age of Onset    Hypertension Mother     Anemia Mother     Diabetes Father     Hypertension Father       Social History     Tobacco Use    Smoking status: Never    Smokeless tobacco: Never   Substance Use Topics    Alcohol use: No       ROS:    Review of Systems   Constitutional: Negative for fever. HENT:  Negative for stridor. Eyes:  Negative for pain. Cardiovascular:  Negative for chest pain. Respiratory:  Negative for cough. Endocrine: Negative for cold intolerance. Skin:  Negative for nail changes. Musculoskeletal:  Negative for arthritis. Gastrointestinal:  Negative for abdominal pain. Genitourinary:  Negative for dysuria. Neurological:  Negative for aphonia. Psychiatric/Behavioral:  Negative for altered mental status. Allergic/Immunologic: Negative for hives.          PHYSICAL EXAM:    BP 92/60   Pulse 77   Ht 5' 7\" (1.702 m)   Wt 170 lb 14.4 oz (77.5 kg)   BMI 26.77 kg/m²        Wt Readings from Last 3 Encounters:   11/03/22 170 lb 14.4 oz (77.5 kg)   09/20/22 173 lb (78.5 kg)   09/20/22 173 lb (78.5 kg)     BP Readings from Last 3 Encounters:   11/03/22 92/60   09/20/22 109/70   09/19/22 108/62         Physical Exam  Vitals reviewed. HENT:      Head: Normocephalic. Right Ear: External ear normal.      Left Ear: External ear normal.      Nose: Nose normal.   Eyes:      General: No scleral icterus. Pulmonary:      Effort: Pulmonary effort is normal.   Abdominal:      General: There is no distension. Musculoskeletal:      Cervical back: Neck supple. Skin:     General: Skin is warm. Neurological:      Mental Status: She is alert. Mental status is at baseline. Medical problems and test results were reviewed with the patient today. No results found for this or any previous visit (from the past 672 hour(s)). Lab Results   Component Value Date/Time    CHOL 209 07/21/2021 08:47 AM    HDL 72 07/21/2021 08:47 AM    VLDL 28 10/26/2020 02:05 PM              FLORENCIO Gorman was seen today for irregular heart beat and 3 month follow-up. Diagnoses and all orders for this visit:    Tachycardia, unspecified    Tachycardia  -     EKG 12 Lead    Shortness of breath    Other orders  -     dilTIAZem (CARDIZEM) 30 MG tablet; Take 1 tablet by mouth 3 times daily as needed (rapid heart rates.)    Return in about 6 months (around 5/3/2023).        Sola Galicia MD  11/3/2022  1:14 PM

## 2022-11-08 ENCOUNTER — OFFICE VISIT (OUTPATIENT)
Dept: NEUROLOGY | Age: 27
End: 2022-11-08
Payer: COMMERCIAL

## 2022-11-08 VITALS
BODY MASS INDEX: 27.31 KG/M2 | HEIGHT: 67 IN | SYSTOLIC BLOOD PRESSURE: 134 MMHG | DIASTOLIC BLOOD PRESSURE: 71 MMHG | WEIGHT: 174 LBS

## 2022-11-08 DIAGNOSIS — G89.29 CHRONIC NONINTRACTABLE HEADACHE, UNSPECIFIED HEADACHE TYPE: ICD-10-CM

## 2022-11-08 DIAGNOSIS — R51.9 CHRONIC NONINTRACTABLE HEADACHE, UNSPECIFIED HEADACHE TYPE: ICD-10-CM

## 2022-11-08 DIAGNOSIS — H53.30 BINOCULAR VISION DEFECT: Primary | ICD-10-CM

## 2022-11-08 PROCEDURE — 99204 OFFICE O/P NEW MOD 45 MIN: CPT | Performed by: PSYCHIATRY & NEUROLOGY

## 2022-11-08 RX ORDER — ACETAMINOPHEN 160 MG
10000 TABLET,DISINTEGRATING ORAL
COMMUNITY

## 2022-11-08 ASSESSMENT — ENCOUNTER SYMPTOMS
GASTROINTESTINAL NEGATIVE: 1
RESPIRATORY NEGATIVE: 1
EYES NEGATIVE: 1
ALLERGIC/IMMUNOLOGIC NEGATIVE: 1

## 2022-11-08 NOTE — PROGRESS NOTES
[address], [facility], [address]  Phone: [phone] Fax [phone]  Dr. Ben Disla      11/8/2022  Hector Jarvis     Patient is referred by the following provider for consultation regarding as below:       I reviewed the available records and notes and have examined patient with the following findings:     Chief Complaint:  Chief Complaint   Patient presents with    Neurologic Problem     dizziness          HPI: This is a right handed 32 y.o. Single female who is very pleasant very appropriate patient is very clear in July 10 the 2022 she got off of an airplane while on the airplane she started getting some C's motion sickness. 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 She came off the airplane just thinking everything will resolve when she hits the ground and it did not it even did not resolve for 3 months she continued to have lightheaded not vertigo nausea headaches either orbital headaches it could be the right or could be left. Significant nausea is almost chronic nausea she was taking over-the-counter medications and Zofran on a regular basis for headaches and and this. She went to the emergency room they of course told her it was for her vertigo CT scan was normal she did not she know it was not vertigo she had no symptoms of vertigo. She even went to ENT and cardiology vestibular physical therapy all of which nothing really resolved and they did not have any explanation. She did not have an appointment to see us in quite some time but she is working out of [facility] and she is they were able to connect her with a neurologist who thought she was having vestibular migraines which are all appropriate diagnoses. She was taking Advil and Tylenol which did nothing for they tried Prozac and BuSpar thinking she was \"crazy\" according to the patient. They felt it was \"stress and anxiety\". The patient knew was not.   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 She went to a neuro-ophthalmologist who clearly stated she does have this and what this is is basically one of her extraocular motors is a little off not enough to cause double vision but to throw everything off. They started prisms which absolutely helped her. But they and their \"micro prisms\". The patient then is starting to improve with it. There is no question but they know she is can have to change prisms in next week or changing more prisms. Her headaches are typically either side or periorbital usually I would guess which with a lot of I have her eyes she is straining at the moment. Nausea photophobia and phonophobia she gets about 4-week when it flares up she is really it is really associated with nausea and photophobia phonophobia. But as the added prisms she has been improving and she is going for more prisms next week. She was given Imitrex which did not help and only 1 pill of Ubrelvy which did not help but it should be noted that I do not think we should ever use triptans in her we do not know what actually initially caused her extraocular motor changes and certainly vascular could be a possibility. She has a mother who does suffer from migraine headaches is taking Nurtec. IMAGING REVIEW:  I REVIEWED PERTINENT  IMAGES AND REPORTS WITH THE PATIENT PERSONALLY, DIRECTLY AND FULLY.      Past Medical History:  Past Medical History:   Diagnosis Date    Urinary tract infection, site not specified     Vesicoureteral reflux, unspecified or without reflux nephropathy 1/7/2014       Past Surgical History:  Past Surgical History:   Procedure Laterality Date    UROLOGICAL SURGERY         Social History:  Social History     Socioeconomic History    Marital status:      Spouse name: Not on file    Number of children: Not on file    Years of education: Not on file    Highest education level: Not on file   Occupational History    Not on file Tobacco Use    Smoking status: Never    Smokeless tobacco: Never   Vaping Use    Vaping Use: Never used   Substance and Sexual Activity    Alcohol use: No    Drug use: No    Sexual activity: Yes     Partners: Male     Birth control/protection: Pill   Other Topics Concern    Not on file   Social History Narrative    Not on file     Social Determinants of Health     Financial Resource Strain: Not on file   Food Insecurity: Not on file   Transportation Needs: Not on file   Physical Activity: Not on file   Stress: Not on file   Social Connections: Not on file   Intimate Partner Violence: Not on file   Housing Stability: Not on file       Family History:   Family History   Problem Relation Age of Onset    Hypertension Mother     Anemia Mother     Diabetes Father     Hypertension Father        Current Outpatient Medications on File Prior to Visit   Medication Sig Dispense Refill    Cholecalciferol (VITAMIN D3) 50 MCG (2000 UT) CAPS Take 10,000 Units by mouth      dilTIAZem (CARDIZEM) 30 MG tablet Take 1 tablet by mouth 3 times daily as needed (rapid heart rates.) 120 tablet 3    ondansetron (ZOFRAN) 4 MG tablet       Prenatal MV-Min-Fe Fum-FA-DHA (PRENATAL 1 PO) Take by mouth      Bacillus Coagulans-Inulin (PROBIOTIC) 1-250 BILLION-MG CAPS Take by mouth      acetaminophen (TYLENOL) 325 MG tablet Take 650 mg by mouth every 6 hours as needed for Pain      ibuprofen (ADVIL;MOTRIN) 200 MG tablet Take 200 mg by mouth every 6 hours as needed for Pain       No current facility-administered medications on file prior to visit. Allergies   Allergen Reactions    Shellfish-Derived Products Nausea And Vomiting       Review of Systems:  Review of Systems   Constitutional: Negative. HENT: Negative. Eyes: Negative. Respiratory: Negative. Cardiovascular: Negative. Gastrointestinal: Negative. Endocrine: Negative. Genitourinary: Negative. Musculoskeletal: Negative. Skin: Negative. Allergic/Immunologic: Negative. Neurological:  Positive for light-headedness and headaches. Psychiatric/Behavioral: Negative. No flowsheet data found. No flowsheet data found. Vitals:    11/08/22 1344   BP: 134/71   Weight: 174 lb (78.9 kg)   Height: 5' 7\" (1.702 m)        Physical Exam  Constitutional:       Appearance: Normal appearance. HENT:      Head: Normocephalic and atraumatic. Eyes:      Extraocular Movements: Extraocular movements intact and EOM normal.      Pupils: Pupils are equal, round, and reactive to light. Cardiovascular:      Rate and Rhythm: Normal rate. Pulses: Normal pulses. Pulmonary:      Effort: Pulmonary effort is normal.   Abdominal:      General: Abdomen is flat. Neurological:      Mental Status: She is alert and oriented to person, place, and time. Gait: Gait is intact. Deep Tendon Reflexes:      Reflex Scores:       Tricep reflexes are 1+ on the right side and 1+ on the left side. Bicep reflexes are 1+ on the right side and 1+ on the left side. Brachioradialis reflexes are 1+ on the right side and 1+ on the left side. Patellar reflexes are 1+ on the right side and 1+ on the left side. Achilles reflexes are 1+ on the right side and 1+ on the left side. Neurologic Exam     Mental Status   Oriented to person, place, and time. Attention: normal. Concentration: normal.   Level of consciousness: alert  Knowledge: good. Cranial Nerves     CN II   Visual fields full to confrontation. CN III, IV, VI   Pupils are equal, round, and reactive to light. Extraocular motions are normal.     CN VII   Facial expression full, symmetric.      Motor Exam   Right arm tone: normal  Left arm tone: normal  Right leg tone: normal  Left leg tone: normal    Gait, Coordination, and Reflexes     Gait  Gait: normal    Tremor   Resting tremor: absent  Intention tremor: absent  Action tremor: absent    Reflexes   Right brachioradialis: 1+  Left brachioradialis: 1+  Right biceps: 1+  Left biceps: 1+  Right triceps: 1+  Left triceps: 1+  Right patellar: 1+  Left patellar: 1+  Right achilles: 1+  Left achilles: 1+        Assessment   Assessment / Plan:    Suzanne Luong was seen today for neurologic problem. 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 And they have already knew that they were going to have to add prisms to it but they have to start slow. So next week she gets another series of prisms put in. And what we do not know is if this will completely resolve her headaches since the prisms already have helped quite a bit. I did give her samples of Nurtec. We are not can use any triptans in just in case it is vascular related. She will contact me after about a month of the new prisms and if she still has bad headaches were going to try her on Aimovig 70 mg. Chronic nonintractable headache, unspecified headache type        The Diagnosis and differential diagnostic considerations, and Rx Tx were reviewed with the patient at length. No orders of the defined types were placed in this encounter. I have spent greater than 50% of visit discussing and counseling of patient  for treatment and diagnostic plan review. Total time 45 min     . Notes: Patient is to continue all medications as directed by prescribing physicians. Continuations on today's visit are made based on the patient's report of current medications.              Dr. Andressa Peters  Consultation Neurology, Neurodiagnostics and Neurotherapeutics  Neuroelectrophysiology, EEG, EMG  [facility]  [address] [place] [address], [address] [facility]  Phone:  [phone]  Fax:   [phone]

## 2022-11-29 ENCOUNTER — OFFICE VISIT (OUTPATIENT)
Dept: OBGYN CLINIC | Age: 27
End: 2022-11-29
Payer: COMMERCIAL

## 2022-11-29 VITALS
DIASTOLIC BLOOD PRESSURE: 62 MMHG | BODY MASS INDEX: 27.15 KG/M2 | HEIGHT: 67 IN | WEIGHT: 173 LBS | SYSTOLIC BLOOD PRESSURE: 114 MMHG

## 2022-11-29 DIAGNOSIS — N93.9 ABNORMAL UTERINE BLEEDING (AUB): Primary | ICD-10-CM

## 2022-11-29 PROCEDURE — 99213 OFFICE O/P EST LOW 20 MIN: CPT | Performed by: OBSTETRICS & GYNECOLOGY

## 2022-11-29 RX ORDER — ONDANSETRON 4 MG/1
8 TABLET, ORALLY DISINTEGRATING ORAL 3 TIMES DAILY PRN
Qty: 21 TABLET | Refills: 0 | Status: SHIPPED | OUTPATIENT
Start: 2022-11-29

## 2022-11-29 NOTE — PROGRESS NOTES
Patient presents today for   Chief Complaint   Patient presents with    Vaginal Bleeding         LMP: Patient's last menstrual period was 11/10/2022. Contraception: none        Allergies   Allergen Reactions    Shellfish-Derived Products Nausea And Vomiting     Current Outpatient Medications   Medication Sig Dispense Refill    ondansetron (ZOFRAN-ODT) 4 MG disintegrating tablet Take 2 tablets by mouth 3 times daily as needed for Nausea or Vomiting 21 tablet 0    Cholecalciferol (VITAMIN D3) 50 MCG (2000 UT) CAPS Take 10,000 Units by mouth      ondansetron (ZOFRAN) 4 MG tablet       Prenatal MV-Min-Fe Fum-FA-DHA (PRENATAL 1 PO) Take by mouth      Bacillus Coagulans-Inulin (PROBIOTIC) 1-250 BILLION-MG CAPS Take by mouth      acetaminophen (TYLENOL) 325 MG tablet Take 650 mg by mouth every 6 hours as needed for Pain      ibuprofen (ADVIL;MOTRIN) 200 MG tablet Take 200 mg by mouth every 6 hours as needed for Pain      dilTIAZem (CARDIZEM) 30 MG tablet Take 1 tablet by mouth 3 times daily as needed (rapid heart rates.) (Patient not taking: Reported on 11/29/2022) 120 tablet 3     No current facility-administered medications for this visit.      Past Medical History:   Diagnosis Date    Urinary tract infection, site not specified     Vesicoureteral reflux, unspecified or without reflux nephropathy 1/7/2014     Past Surgical History:   Procedure Laterality Date    UROLOGICAL SURGERY       Social History     Socioeconomic History    Marital status:      Spouse name: Not on file    Number of children: Not on file    Years of education: Not on file    Highest education level: Not on file   Occupational History    Not on file   Tobacco Use    Smoking status: Never    Smokeless tobacco: Never   Vaping Use    Vaping Use: Never used   Substance and Sexual Activity    Alcohol use: No    Drug use: No    Sexual activity: Yes     Partners: Male     Birth control/protection: Pill   Other Topics Concern    Not on file   Social History Narrative    Not on file     Social Determinants of Health     Financial Resource Strain: Not on file   Food Insecurity: Not on file   Transportation Needs: Not on file   Physical Activity: Not on file   Stress: Not on file   Social Connections: Not on file   Intimate Partner Violence: Not on file   Housing Stability: Not on file     Family History   Problem Relation Age of Onset    Hypertension Mother     Anemia Mother     Diabetes Father     Hypertension Father      /62   Ht 5' 7\" (1.702 m)   Wt 173 lb (78.5 kg)   LMP 11/10/2022   BMI 27.10 kg/m²      ROS:  Constitutional: Negative. HENT: Negative. Eyes: Negative. Respiratory: Negative. Cardiovascular: Negative. Gastrointestinal: Negative. Endocrine: Negative. Genitourinary: Negative. Musculoskeletal: Negative. Allergic/Immunologic: Negative. Neurological: Negative. Hematological: Negative. Psychiatric/Behavioral: Negative. Physical Exam  Vitals reviewed. Constitutional:       General: She is not in acute distress. Appearance: Normal appearance. She is well-developed. She is not ill-appearing, toxic-appearing or diaphoretic. HENT:      Head: Normocephalic and atraumatic. Eyes:      General: No scleral icterus. Conjunctiva/sclera: Conjunctivae normal.      Pupils: Pupils are equal, round, and reactive to light. Pulmonary:      Effort: Pulmonary effort is normal. No respiratory distress. Abdominal:      General: There is no distension. Musculoskeletal:         General: Normal range of motion. Cervical back: Normal range of motion and neck supple. Skin:     General: Skin is warm and dry. Coloration: Skin is not jaundiced or pale. Findings: No erythema or rash. Neurological:      General: No focal deficit present. Mental Status: She is alert, oriented to person, place, and time and easily aroused. Motor: No abnormal muscle tone.       Coordination: Coordination normal.   Psychiatric:         Mood and Affect: Mood normal.         Speech: Speech normal.         Behavior: Behavior normal. Behavior is cooperative. Thought Content: Thought content normal.         Judgment: Judgment normal.            1. Abnormal uterine bleeding (AUB)      No orders of the defined types were placed in this encounter. Orders Placed This Encounter   Medications    ondansetron (ZOFRAN-ODT) 4 MG disintegrating tablet     Sig: Take 2 tablets by mouth 3 times daily as needed for Nausea or Vomiting     Dispense:  21 tablet     Refill:  0     Discussed her menstrual complaints. Options for hormonal regulation discussed - hesitant to try pt on anything given how complex her neuro symptoms have been. See neuro notes, reviewed them today. Pt is being treated by a neuro-ophthalmologist with prisms and it does seem to be helping. Shared my concern with her that I am worried that a pregnancy may disrupt her treatment due to the physiologic changes that occur in pregnancy, but overall that is something she will have to decide on her own - once at ideal treatment outcome, perhaps could discuss r/b/a with her Neuro-Opth. Pt agreeable to continuing to monitor her periods for now. Will see her back when she is due for annual exam. Greater than 20 minutes spent on face to face counseling, education, and examining the patient regarding her exam findings, indications for further tests, and discussion of assessment and plan as stated above. Return if symptoms worsen or fail to improve, for annual exam when due.

## 2022-12-09 ENCOUNTER — TELEPHONE (OUTPATIENT)
Dept: NEUROLOGY | Age: 27
End: 2022-12-09

## 2022-12-12 ENCOUNTER — CLINICAL DOCUMENTATION (OUTPATIENT)
Dept: NEUROLOGY | Age: 27
End: 2022-12-12

## 2022-12-12 NOTE — PROGRESS NOTES
Samples are at the  for . I left vm for pt that the samples of [facility] are at the front for .

## 2022-12-29 ENCOUNTER — CLINICAL DOCUMENTATION (OUTPATIENT)
Dept: NEUROLOGY | Age: 27
End: 2022-12-29

## 2022-12-30 NOTE — PROGRESS NOTES
We are going to bring her in her get and get her trained in either [employer] or [facility] depending on whether she has constipation issues.

## 2023-01-03 ENCOUNTER — CLINICAL DOCUMENTATION (OUTPATIENT)
Dept: NEUROLOGY | Age: 28
End: 2023-01-03

## 2023-01-09 ENCOUNTER — TELEPHONE (OUTPATIENT)
Dept: CARDIOLOGY CLINIC | Age: 28
End: 2023-01-09

## 2023-01-09 NOTE — TELEPHONE ENCOUNTER
Been super fatigued. She says anything she does her HR jumps up into 120-150's. She said this has been going on about about a week and seems to be getting worse. She had a cold a few weeks before all this started. Apple watch showing sinus tach.  gave her Diltiazem in past but she never picked it up because it had dropped BP too much in the past.She is not on anything in general for her tachycardia. Any advice?

## 2023-01-09 NOTE — TELEPHONE ENCOUNTER
Heart rate is high in the 160s after taking a shower.  Gave her an appt for next week but she wants the nurse to call

## 2023-01-10 NOTE — TELEPHONE ENCOUNTER
,  She said last time she took Corlanor it was very expensive. She did not qualify for pt.assistance in the past.She currently does not have insurance. She is currently not working. She would prefer to see what is happening in her body that is causing the issue instead of just throwing more medication on her. She prefers not to be on more medicine. She would like to come in for an appt. I moved her appt. up to this Friday/Her current HR is 118-120. Her avg.HR is 110.

## 2023-01-13 ENCOUNTER — OFFICE VISIT (OUTPATIENT)
Dept: CARDIOLOGY CLINIC | Age: 28
End: 2023-01-13

## 2023-01-13 VITALS
DIASTOLIC BLOOD PRESSURE: 72 MMHG | WEIGHT: 174 LBS | HEIGHT: 67 IN | SYSTOLIC BLOOD PRESSURE: 104 MMHG | HEART RATE: 86 BPM | BODY MASS INDEX: 27.31 KG/M2

## 2023-01-13 DIAGNOSIS — R00.0 TACHYCARDIA, UNSPECIFIED: ICD-10-CM

## 2023-01-13 DIAGNOSIS — R00.0 TACHYCARDIA: Primary | ICD-10-CM

## 2023-01-13 PROCEDURE — 93000 ELECTROCARDIOGRAM COMPLETE: CPT | Performed by: INTERNAL MEDICINE

## 2023-01-13 PROCEDURE — 99214 OFFICE O/P EST MOD 30 MIN: CPT | Performed by: INTERNAL MEDICINE

## 2023-01-13 ASSESSMENT — ENCOUNTER SYMPTOMS
NAIL CHANGES: 0
APHONIA: 0
ABDOMINAL PAIN: 0
STRIDOR: 0
EYE PAIN: 0
COUGH: 0

## 2023-01-13 NOTE — PROGRESS NOTES
[address], [address] [place] [address], [address] [place] [address]  PHONE: [phone]    SUBJECTIVE:   Araceli Layton is a 32 y.o. female 1995   seen for a follow up visit regarding the following:     Chief Complaint   Patient presents with    Irregular Heart Beat    Palpitations           History of present illness: 32 y.o. female presented for follow-up 1/13/23 for follow-up with worsening tachycardia palpitations. We have discussed possibility of autonomic dysfunction in the past dizziness was attributed to vision abnormalities and she was prescribed vision correction with improvement today she has palpitations with associated shortness of breath. Interval history:  COVID infection Aug 2022. Had Sameer has light headed palpations with minimal activity. Driving with episode of palpations and heart rates last week. Patient described dizziness that is recurrent as well. Symptoms have worsened following COVID-19 infection but some symptoms preceding this. Previously patient had trial of ivabradine with effectiveness. She discontinued this in had improvement of symptoms following treatment for a short course. Patient working as a traveling nurse ICU and critical care       Interval Hx:      Patient with previous history of COVID infection. The patient worked as a nurse in the [address] [place] [address] ICU. She had a long course of COVID with some respiratory events, but mostly back pain. Subsequent myalgias. She received steroid therapy with improvement  of symptoms originally. At the time of appointment 11/06/2020, the patient described episodes of shortness of breath, as well as rapid heart rates when taking part in minimal physical activities. Her last COVID negative test was 11/05/2020 per the patient. Cardiac History:      ECG 11/06/2020 - sinus rhythm, RSR V1, voltage criteria for LVH, abnormal precordial contours.      2020 echocardiogram normal for age RVSP 60 mmHg    Cardiac monitor no arrhythmia minimum heart rate 51 bpm max 149 bpm average heart rate 93 bpm  11/3/2022 EKG sinus rhythm left ventricular hypertrophy  1/13/2022 sinus rhythm, normal rate, normal OH intervals, ST wave normal, normal axis      Assessment and Plan:        Tachycardia,    Has been on ivabrdine in the past   Intermittent palpations   Previous monitoring and heart rate pattern are not classic for IST. We discussed trial of ivabradine emphasized that pregnancy test would need to be obtained prior. Patient is self-pay and will obtain pregnancy test on her own we discussed that ivabradine is not to be initiated in the event she is pregnant  Patient and family feel underlying causes should be evaluated further I recommended referral to [address] at [facility] for autonomic work-up since our previous work-up has not been classic for autonomic dysfunction and previous therapies have been ineffective and there has been recurrence of symptoms with no clear cardiac etiology. Current Outpatient Medications   Medication Sig    ondansetron (ZOFRAN-ODT) 4 MG disintegrating tablet Take 2 tablets by mouth 3 times daily as needed for Nausea or Vomiting    Cholecalciferol (VITAMIN D3) 50 MCG (2000 UT) CAPS Take 10,000 Units by mouth    ondansetron (ZOFRAN) 4 MG tablet     Prenatal MV-Min-Fe Fum-FA-DHA (PRENATAL 1 PO) Take by mouth    Bacillus Coagulans-Inulin (PROBIOTIC) 1-250 BILLION-MG CAPS Take by mouth    acetaminophen (TYLENOL) 325 MG tablet Take 650 mg by mouth every 6 hours as needed for Pain    ibuprofen (ADVIL;MOTRIN) 200 MG tablet Take 200 mg by mouth every 6 hours as needed for Pain     No current facility-administered medications for this visit. Past Medical History, Past Surgical History, Family history, Social History, and Medications were all reviewed with the patient today and updated as necessary.        Allergies   Allergen Reactions    Shellfish-Derived Products Nausea And Vomiting     Past Medical History:   Diagnosis Date    Urinary tract infection, site not specified     Vesicoureteral reflux, unspecified or without reflux nephropathy 1/7/2014     Past Surgical History:   Procedure Laterality Date    UROLOGICAL SURGERY       Family History   Problem Relation Age of Onset    Hypertension Mother     Anemia Mother     Diabetes Father     Hypertension Father       Social History     Tobacco Use    Smoking status: Never    Smokeless tobacco: Never   Substance Use Topics    Alcohol use: No       ROS:    Review of Systems   Constitutional: Negative for fever. HENT:  Negative for stridor. Eyes:  Negative for pain. Cardiovascular:  Negative for chest pain. Respiratory:  Negative for cough. Endocrine: Negative for cold intolerance. Skin:  Negative for nail changes. Musculoskeletal:  Negative for arthritis. Gastrointestinal:  Negative for abdominal pain. Genitourinary:  Negative for dysuria. Neurological:  Negative for aphonia. Psychiatric/Behavioral:  Negative for altered mental status. Allergic/Immunologic: Negative for hives. PHYSICAL EXAM:    /72   Pulse 86   Ht 5' 7\" (1.702 m)   Wt 174 lb (78.9 kg)   BMI 27.25 kg/m²        Wt Readings from Last 3 Encounters:   01/13/23 174 lb (78.9 kg)   11/29/22 173 lb (78.5 kg)   11/08/22 174 lb (78.9 kg)     BP Readings from Last 3 Encounters:   01/13/23 104/72   11/29/22 114/62   11/08/22 134/71         Physical Exam  Vitals reviewed. HENT:      Head: Normocephalic. Right Ear: External ear normal.      Left Ear: External ear normal.      Nose: Nose normal.   Eyes:      General: No scleral icterus. Pulmonary:      Effort: Pulmonary effort is normal.   Abdominal:      General: There is no distension. Musculoskeletal:      Cervical back: Neck supple. Skin:     General: Skin is warm. Neurological:      Mental Status: She is alert. Mental status is at baseline.        Medical problems and test results were reviewed with the patient today. No results found for this or any previous visit (from the past 672 hour(s)). Lab Results   Component Value Date/Time    CHOL 209 07/21/2021 08:47 AM    HDL 72 07/21/2021 08:47 AM    VLDL 28 10/26/2020 02:05 PM              FLORENCIO Fnues was seen today for irregular heart beat and palpitations. Diagnoses and all orders for this visit:    Tachycardia  -     EKG 12 Lead  -     External Referral To Cardiology    Tachycardia, unspecified  -     EKG 12 Lead    No follow-ups on file.        Jair Vasquez MD  1/13/2023  9:04 AM

## 2023-01-24 ENCOUNTER — TELEPHONE (OUTPATIENT)
Dept: OBGYN CLINIC | Age: 28
End: 2023-01-24

## 2023-01-24 NOTE — TELEPHONE ENCOUNTER
Pt is currently 6w3d by LMP of 12/10/22. Pt called with c/o N/V. Pt encouraged to take Unisom 25 mg at night and Vit b6 50 mg tid. Pt to increase hydration and have small frequent bland snacks. Pt agree's and voiced understanding.

## 2023-01-26 ENCOUNTER — TELEPHONE (OUTPATIENT)
Dept: OBGYN CLINIC | Age: 28
End: 2023-01-26

## 2023-01-26 RX ORDER — ONDANSETRON 8 MG/1
8 TABLET, ORALLY DISINTEGRATING ORAL EVERY 8 HOURS PRN
Qty: 30 TABLET | Refills: 1 | Status: SHIPPED | OUTPATIENT
Start: 2023-01-26

## 2023-01-26 NOTE — TELEPHONE ENCOUNTER
Pt called with c/o dizziness, headaches, light headedness, \"weak and shaky\" and constant nausea. Pt is early pregnant. Pt states she is able to keep food and liquid down but is dry heaving. Pt stated taking Unisom and Vit B6 for N/V. Pt reports Sulma Irwin made her \"almost hallucinate. \" Advised pt to stop Unisom. Rx for Zofran sent. Instructed pt to be cautious of use r/t constipation, increase fiber, colace PRN. Pt instructed to increase hydration and use excedrin tension, IF s/sx do not improve to go to [facility] ED for evaluation and possible IV fluids. Pt agree's and voiced understanding.

## 2023-01-26 NOTE — TELEPHONE ENCOUNTER
VM received from pt c/o dizziness and light headedness. Call back to pt. No answer. LM to return call.

## 2023-01-27 ENCOUNTER — HOSPITAL ENCOUNTER (EMERGENCY)
Age: 28
Discharge: HOME | End: 2023-01-27
Attending: EMERGENCY MEDICINE
Payer: MEDICAID

## 2023-01-27 VITALS
TEMPERATURE: 98.4 F | HEART RATE: 116 BPM | DIASTOLIC BLOOD PRESSURE: 84 MMHG | BODY MASS INDEX: 27.47 KG/M2 | OXYGEN SATURATION: 100 % | WEIGHT: 175 LBS | HEIGHT: 67 IN | RESPIRATION RATE: 16 BRPM | SYSTOLIC BLOOD PRESSURE: 116 MMHG

## 2023-01-27 DIAGNOSIS — R53.83 FATIGUE, UNSPECIFIED TYPE: ICD-10-CM

## 2023-01-27 DIAGNOSIS — R42 DIZZINESS: Primary | ICD-10-CM

## 2023-01-27 LAB
ALBUMIN SERPL-MCNC: 3.5 G/DL (ref 3.5–5)
ALBUMIN/GLOB SERPL: 0.8 (ref 0.4–1.6)
ALP SERPL-CCNC: 96 U/L (ref 50–130)
ALT SERPL-CCNC: 34 U/L (ref 12–65)
ANION GAP SERPL CALC-SCNC: 5 MMOL/L (ref 2–11)
APPEARANCE UR: ABNORMAL
AST SERPL-CCNC: 14 U/L (ref 15–37)
BASOPHILS # BLD: 0 K/UL (ref 0–0.2)
BASOPHILS NFR BLD: 0 % (ref 0–2)
BILIRUB SERPL-MCNC: 0.2 MG/DL (ref 0.2–1.1)
BILIRUB UR QL: NEGATIVE
BUN SERPL-MCNC: 10 MG/DL (ref 6–23)
CALCIUM SERPL-MCNC: 9.4 MG/DL (ref 8.3–10.4)
CHLORIDE SERPL-SCNC: 106 MMOL/L (ref 101–110)
CO2 SERPL-SCNC: 25 MMOL/L (ref 21–32)
COLOR UR: ABNORMAL
CREAT SERPL-MCNC: 0.69 MG/DL (ref 0.6–1)
DIFFERENTIAL METHOD BLD: NORMAL
EOSINOPHIL # BLD: 0.1 K/UL (ref 0–0.8)
EOSINOPHIL NFR BLD: 1 % (ref 0.5–7.8)
ERYTHROCYTE [DISTWIDTH] IN BLOOD BY AUTOMATED COUNT: 12.1 % (ref 11.9–14.6)
GLOBULIN SER CALC-MCNC: 4.5 G/DL (ref 2.8–4.5)
GLUCOSE SERPL-MCNC: 99 MG/DL (ref 65–100)
GLUCOSE UR STRIP.AUTO-MCNC: NEGATIVE MG/DL
HCG UR QL: POSITIVE
HCT VFR BLD AUTO: 39.6 % (ref 35.8–46.3)
HGB BLD-MCNC: 13.2 G/DL (ref 11.7–15.4)
HGB UR QL STRIP: NEGATIVE
IMM GRANULOCYTES # BLD AUTO: 0 K/UL (ref 0–0.5)
IMM GRANULOCYTES NFR BLD AUTO: 0 % (ref 0–5)
KETONES UR QL STRIP.AUTO: ABNORMAL MG/DL
LEUKOCYTE ESTERASE UR QL STRIP.AUTO: NEGATIVE
LIPASE SERPL-CCNC: 185 U/L (ref 73–393)
LYMPHOCYTES # BLD: 2.4 K/UL (ref 0.5–4.6)
LYMPHOCYTES NFR BLD: 26 % (ref 13–44)
MCH RBC QN AUTO: 28.6 PG (ref 26.1–32.9)
MCHC RBC AUTO-ENTMCNC: 33.3 G/DL (ref 31.4–35)
MCV RBC AUTO: 85.7 FL (ref 82–102)
MONOCYTES # BLD: 0.5 K/UL (ref 0.1–1.3)
MONOCYTES NFR BLD: 5 % (ref 4–12)
NEUTS SEG # BLD: 6.2 K/UL (ref 1.7–8.2)
NEUTS SEG NFR BLD: 68 % (ref 43–78)
NITRITE UR QL STRIP.AUTO: NEGATIVE
NRBC # BLD: 0 K/UL (ref 0–0.2)
PH UR STRIP: 8 (ref 5–9)
PLATELET # BLD AUTO: 306 K/UL (ref 150–450)
PMV BLD AUTO: 9.7 FL (ref 9.4–12.3)
POTASSIUM SERPL-SCNC: 3.9 MMOL/L (ref 3.5–5.1)
PROT SERPL-MCNC: 8 G/DL (ref 6.3–8.2)
PROT UR STRIP-MCNC: NEGATIVE MG/DL
RBC # BLD AUTO: 4.62 M/UL (ref 4.05–5.2)
SODIUM SERPL-SCNC: 136 MMOL/L (ref 133–143)
SP GR UR REFRACTOMETRY: 1.02 (ref 1–1.02)
UROBILINOGEN UR QL STRIP.AUTO: 0.2 EU/DL (ref 0.2–1)
WBC # BLD AUTO: 9.1 K/UL (ref 4.3–11.1)

## 2023-01-27 PROCEDURE — 81025 URINE PREGNANCY TEST: CPT

## 2023-01-27 PROCEDURE — 80053 COMPREHEN METABOLIC PANEL: CPT

## 2023-01-27 PROCEDURE — 85025 COMPLETE CBC W/AUTO DIFF WBC: CPT

## 2023-01-27 PROCEDURE — 83690 ASSAY OF LIPASE: CPT

## 2023-01-27 PROCEDURE — 81003 URINALYSIS AUTO W/O SCOPE: CPT

## 2023-01-27 PROCEDURE — 99284 EMERGENCY DEPT VISIT MOD MDM: CPT | Performed by: NURSE PRACTITIONER

## 2023-01-27 PROCEDURE — 96360 HYDRATION IV INFUSION INIT: CPT | Performed by: NURSE PRACTITIONER

## 2023-01-27 PROCEDURE — 2580000003 HC RX 258: Performed by: NURSE PRACTITIONER

## 2023-01-27 RX ORDER — 0.9 % SODIUM CHLORIDE 0.9 %
1000 INTRAVENOUS SOLUTION INTRAVENOUS
Status: COMPLETED | OUTPATIENT
Start: 2023-01-27 | End: 2023-01-27

## 2023-01-27 RX ADMIN — SODIUM CHLORIDE 1000 ML: 9 INJECTION, SOLUTION INTRAVENOUS at 17:15

## 2023-01-27 ASSESSMENT — ENCOUNTER SYMPTOMS
ABDOMINAL PAIN: 0
NAUSEA: 1
VOMITING: 0

## 2023-01-27 ASSESSMENT — LIFESTYLE VARIABLES
HOW OFTEN DO YOU HAVE A DRINK CONTAINING ALCOHOL: NEVER
HOW MANY STANDARD DRINKS CONTAINING ALCOHOL DO YOU HAVE ON A TYPICAL DAY: PATIENT DOES NOT DRINK

## 2023-01-27 ASSESSMENT — PAIN SCALES - GENERAL: PAINLEVEL_OUTOF10: 7

## 2023-01-27 ASSESSMENT — PAIN - FUNCTIONAL ASSESSMENT: PAIN_FUNCTIONAL_ASSESSMENT: 0-10

## 2023-01-27 NOTE — DISCHARGE INSTRUCTIONS
As we discussed, your labs, urine, and exam today are reassuring. It appears that you are on the right track and that you are following-up with the appropriate specialists. Return to the emergency department for any new, worsening, or concerning symptoms.

## 2023-01-27 NOTE — ED PROVIDER NOTES
Emergency Department Provider Note                   PCP:                Cate Cortés MD               Age: 32 y.o. Sex: female       ICD-10-CM    1. Dizziness  R42       2. Fatigue, unspecified type  R53.83           DISPOSITION Discharge - Pending Orders Complete 01/27/2023 05:45:50 PM        Medical Decision Making  26-year-old female who presents to the emergency department today with complaints of fatigue and dizziness. This is been an ongoing issue since July of last year. Chart review reveals multiple visits for this issue. She has seen neurology, primary care, cardiology, ENT, and a neuro-ophthalmologist out of [facility]. She states that her next appointment is with a neuro ENT specialist but that is in March. Patient appears in no acute distress. No focal neurological deficits noted on exam.  Given this appears to be an ongoing issue, will rule out any acute issues today and check basic labs and urine. Discussed imaging today. Given that she is pregnant, I do not feel imaging is appropriate today and patient is in agreement. Case discussed with my attending physician, Dr. John Sibley as well. Labs are unremarkable. Urine shows she is a little dry but otherwise normal.  No indication of infection. We will give IV fluids and plan to discharge home. Problems Addressed:  Dizziness: undiagnosed new problem with uncertain prognosis  Fatigue, unspecified type: self-limited or minor problem    Amount and/or Complexity of Data Reviewed  Labs: ordered. Risk  Prescription drug management.            Orders Placed This Encounter   Procedures    CBC with Diff    CMP    Lipase    Urinalysis w rflx microscopic    Diet NPO    POCT Urine Dipstick    Orthostatic blood pressure and pulse    POC Pregnancy Urine Qual    POC Pregnancy Urine Qual    Saline lock IV        Medications   0.9 % sodium chloride bolus (1,000 mLs IntraVENous New Bag 1/27/23 2608)       New Prescriptions    No medications on file        Blanca Garcia is a 32 y.o. female who presents to the Emergency Department with chief complaint of    Chief Complaint   Patient presents with    Fatigue             26-year-old female who presents to the emergency department today with complaint of dizziness and fatigue. This is been an ongoing issue since July of last year. She has seen multiple specialist for this including ENT, neurology, cardiology, and a neuro ophthalmologist.  She states she was diagnosed with a binocular vision defect. She was prescribed glasses for this and states this has helped some. She is recently pregnant, estimated 7 weeks. G2, P0. Reports last pregnancy was miscarriage. Symptoms have worsened over the past few weeks. She states she is unable to work due to the dizziness. She states when she turns her head, the dizziness is worse. She occasionally gets headaches. She reports pain that comes up her neck and into the back of her head. She had a normal CT scan last year with the onset of the symptoms when she was seen at the emergency department. The history is provided by the patient. Fatigue  Severity:  Severe  Onset quality:  Gradual  Timing:  Constant  Progression:  Worsening  Relieved by:  None tried  Worsened by:  Nothing  Ineffective treatments:  None tried  Associated symptoms: dizziness and nausea    Associated symptoms: no abdominal pain, no chest pain, no dysuria, no numbness in extremities, no lethargy, no loss of consciousness, no myalgias, no seizures, no sensory-motor deficit, no stroke symptoms, no syncope and no vomiting        Review of Systems   Constitutional:  Positive for fatigue. Cardiovascular:  Negative for chest pain and syncope. Gastrointestinal:  Positive for nausea. Negative for abdominal pain and vomiting. Genitourinary:  Negative for dysuria. Musculoskeletal:  Negative for myalgias. Neurological:  Positive for dizziness.  Negative for seizures and loss of consciousness. All other systems reviewed and are negative. Past Medical History:   Diagnosis Date    Urinary tract infection, site not specified     Vesicoureteral reflux, unspecified or without reflux nephropathy 1/7/2014        Past Surgical History:   Procedure Laterality Date    UROLOGICAL SURGERY          Family History   Problem Relation Age of Onset    Hypertension Mother     Anemia Mother     Diabetes Father     Hypertension Father         Social History     Socioeconomic History    Marital status:    Tobacco Use    Smoking status: Never    Smokeless tobacco: Never   Vaping Use    Vaping Use: Never used   Substance and Sexual Activity    Alcohol use: No    Drug use: No    Sexual activity: Yes     Partners: Male     Birth control/protection: Pill         Shellfish-derived products     Previous Medications    ACETAMINOPHEN (TYLENOL) 325 MG TABLET    Take 650 mg by mouth every 6 hours as needed for Pain    BACILLUS COAGULANS-INULIN (PROBIOTIC) 1-250 BILLION-MG CAPS    Take by mouth    CHOLECALCIFEROL (VITAMIN D3) 50 MCG (2000 UT) CAPS    Take 10,000 Units by mouth    IBUPROFEN (ADVIL;MOTRIN) 200 MG TABLET    Take 200 mg by mouth every 6 hours as needed for Pain    ONDANSETRON (ZOFRAN) 4 MG TABLET        ONDANSETRON (ZOFRAN-ODT) 4 MG DISINTEGRATING TABLET    Take 2 tablets by mouth 3 times daily as needed for Nausea or Vomiting    ONDANSETRON (ZOFRAN-ODT) 8 MG TBDP DISINTEGRATING TABLET    Place 1 tablet under the tongue every 8 hours as needed for Nausea or Vomiting    PRENATAL MV-MIN-FE FUM-FA-DHA (PRENATAL 1 PO)    Take by mouth        Vitals signs and nursing note reviewed. Patient Vitals for the past 4 hrs:   Temp Pulse Resp BP SpO2   01/27/23 1712 -- (!) 116 16 116/84 100 %   01/27/23 1710 -- 94 16 120/76 99 %   01/27/23 1708 -- 85 16 107/65 99 %   01/27/23 1445 98.4 °F (36.9 °C) 85 16 125/75 99 %          Physical Exam  Vitals and nursing note reviewed.    Constitutional: General: She is not in acute distress. Appearance: Normal appearance. She is well-developed. She is not ill-appearing, toxic-appearing or diaphoretic. HENT:      Head: Normocephalic and atraumatic. Right Ear: Tympanic membrane, ear canal and external ear normal.      Left Ear: Tympanic membrane, ear canal and external ear normal.      Mouth/Throat:      Mouth: Mucous membranes are moist.   Eyes:      General: No scleral icterus. Extraocular Movements: Extraocular movements intact. Pupils: Pupils are equal, round, and reactive to light. Cardiovascular:      Rate and Rhythm: Tachycardia present. Heart sounds: Normal heart sounds. Pulmonary:      Effort: Pulmonary effort is normal. No respiratory distress. Breath sounds: Normal breath sounds. Abdominal:      General: Abdomen is flat. Bowel sounds are normal. There is no distension. Palpations: Abdomen is soft. Tenderness: There is no abdominal tenderness. Musculoskeletal:         General: Normal range of motion. Skin:     General: Skin is warm and dry. Capillary Refill: Capillary refill takes less than 2 seconds. Neurological:      General: No focal deficit present. Mental Status: She is alert and oriented to person, place, and time. GCS: GCS eye subscore is 4. GCS verbal subscore is 5. GCS motor subscore is 6. Motor: Motor function is intact. Coordination: Coordination is intact. Comments: Patient appears with normal attention, orientation, concentration, and language. Memory appears normal.  Cranial nerves intact with normal visual fields, extraocular movements.    Psychiatric:         Mood and Affect: Mood normal.         Behavior: Behavior normal.        Procedures    Results for orders placed or performed during the hospital encounter of 01/27/23   CBC with Diff   Result Value Ref Range    WBC 9.1 4.3 - 11.1 K/uL    RBC 4.62 4.05 - 5.2 M/uL    Hemoglobin 13.2 11.7 - 15.4 g/dL Hematocrit 39.6 35.8 - 46.3 %    MCV 85.7 82.0 - 102.0 FL    MCH 28.6 26.1 - 32.9 PG    MCHC 33.3 31.4 - 35.0 g/dL    RDW 12.1 11.9 - 14.6 %    Platelets 966 280 - 862 K/uL    MPV 9.7 9.4 - 12.3 FL    nRBC 0.00 0.0 - 0.2 K/uL    Differential Type AUTOMATED      Seg Neutrophils 68 43 - 78 %    Lymphocytes 26 13 - 44 %    Monocytes 5 4.0 - 12.0 %    Eosinophils % 1 0.5 - 7.8 %    Basophils 0 0.0 - 2.0 %    Immature Granulocytes 0 0.0 - 5.0 %    Segs Absolute 6.2 1.7 - 8.2 K/UL    Absolute Lymph # 2.4 0.5 - 4.6 K/UL    Absolute Mono # 0.5 0.1 - 1.3 K/UL    Absolute Eos # 0.1 0.0 - 0.8 K/UL    Basophils Absolute 0.0 0.0 - 0.2 K/UL    Absolute Immature Granulocyte 0.0 0.0 - 0.5 K/UL   CMP   Result Value Ref Range    Sodium 136 133 - 143 mmol/L    Potassium 3.9 3.5 - 5.1 mmol/L    Chloride 106 101 - 110 mmol/L    CO2 25 21 - 32 mmol/L    Anion Gap 5 2 - 11 mmol/L    Glucose 99 65 - 100 mg/dL    BUN 10 6 - 23 MG/DL    Creatinine 0.69 0.6 - 1.0 MG/DL    Est, Glom Filt Rate >60 >60 ml/min/1.73m2    Calcium 9.4 8.3 - 10.4 MG/DL    Total Bilirubin 0.2 0.2 - 1.1 MG/DL    ALT 34 12 - 65 U/L    AST 14 (L) 15 - 37 U/L    Alk Phosphatase 96 50 - 130 U/L    Total Protein 8.0 6.3 - 8.2 g/dL    Albumin 3.5 3.5 - 5.0 g/dL    Globulin 4.5 2.8 - 4.5 g/dL    Albumin/Globulin Ratio 0.8 0.4 - 1.6     Lipase   Result Value Ref Range    Lipase 185 73 - 393 U/L   Urinalysis w rflx microscopic   Result Value Ref Range    Color, UA YELLOW/STRAW      Appearance CLOUDY      Specific Gravity, UA 1.025 (H) 1.001 - 1.023      pH, Urine 8.0 5.0 - 9.0      Protein, UA Negative NEG mg/dL    Glucose, UA Negative mg/dL    Ketones, Urine TRACE (A) NEG mg/dL    Bilirubin Urine Negative NEG      Blood, Urine Negative NEG      Urobilinogen, Urine 0.2 0.2 - 1.0 EU/dL    Nitrite, Urine Negative NEG      Leukocyte Esterase, Urine Negative NEG     POC Pregnancy Urine Qual   Result Value Ref Range    Preg Test, Ur Positive (A) NEG          No orders to display Voice dictation software was used during the making of this note. This software is not perfect and grammatical and other typographical errors may be present. This note has not been completely proofread for errors.        John Epstein, SABRA - [phone]   01/27/23 0076

## 2023-02-08 ENCOUNTER — TELEPHONE (OUTPATIENT)
Dept: OBGYN CLINIC | Age: 28
End: 2023-02-08

## 2023-02-08 NOTE — TELEPHONE ENCOUNTER
Pt is 8w4d by LMP of 12/10/22. Pt called with c/o all day nausea and vomiting 1-2 times each night. Pt states she is not getting much relief with Zofran 8mg. Pt states she is extremely fatigued and weak, she is having to live with her parents for assistance with ADL's. Pt went to [facility] ED on 1/27/23 for dizziness, was given IVF and discharged home. Hgb in ED was 12.7. Pt states her heart rate is elevated ~140bpms. Notified pt I would review above information with  and call back with recommendations. Pt voiced understanding.

## 2023-02-08 NOTE — TELEPHONE ENCOUNTER
Per  to set up [facility] home health IVF and Zofran pump and to see Neurologist ASAP. Pt notified of recommendations and voiced understanding. Rx for Zofran pump and IVF's faxed to [facility] at [phone]. Order form scanned under media.

## 2023-02-09 ENCOUNTER — TELEPHONE (OUTPATIENT)
Dept: OBGYN CLINIC | Age: 28
End: 2023-02-09

## 2023-02-09 ENCOUNTER — OFFICE VISIT (OUTPATIENT)
Dept: NEUROLOGY | Age: 28
End: 2023-02-09
Payer: MEDICAID

## 2023-02-09 ENCOUNTER — PATIENT MESSAGE (OUTPATIENT)
Dept: OBGYN CLINIC | Age: 28
End: 2023-02-09

## 2023-02-09 VITALS
SYSTOLIC BLOOD PRESSURE: 110 MMHG | WEIGHT: 175 LBS | BODY MASS INDEX: 27.47 KG/M2 | HEIGHT: 67 IN | DIASTOLIC BLOOD PRESSURE: 78 MMHG

## 2023-02-09 DIAGNOSIS — R51.9 CHRONIC INTRACTABLE HEADACHE, UNSPECIFIED HEADACHE TYPE: Primary | ICD-10-CM

## 2023-02-09 DIAGNOSIS — R42 LIGHT HEADED: ICD-10-CM

## 2023-02-09 DIAGNOSIS — G89.29 CHRONIC INTRACTABLE HEADACHE, UNSPECIFIED HEADACHE TYPE: Primary | ICD-10-CM

## 2023-02-09 PROCEDURE — 99214 OFFICE O/P EST MOD 30 MIN: CPT | Performed by: PSYCHIATRY & NEUROLOGY

## 2023-02-09 ASSESSMENT — ENCOUNTER SYMPTOMS
ALLERGIC/IMMUNOLOGIC NEGATIVE: 1
NAUSEA: 1
RESPIRATORY NEGATIVE: 1
EYES NEGATIVE: 1

## 2023-02-09 NOTE — PROGRESS NOTES
[address], [address] [place] [address], [address] [place] [address]  Phone: [phone] Fax [phone]  Dr. Nichelle Ortiz      2/9/2023  Zakiya Correa     Patient is referred by the following provider for consultation regarding as below:       I reviewed the available records and notes and have examined patient with the following findings:     Chief Complaint:  Chief Complaint   Patient presents with    Headache          HPI: This is a right handed 32 y.o.  female who is very frustrated here with her mom who is also very frustrated that keeps saying that nobody will touch her know we will take care of her nobody wants anything to do with her. I actually evaluate the patient last November because she started having symptoms in July 2022 where she was on an airplane she was flying she also got seasickness. As soon as they landed she was chronically lightheaded and absolutely not vertigo. At no point and she makes it abundantly clear throughout the evaluation she has no vertigo symptoms the room is not spinning on her but she feels chronically lightheaded. She also has headaches they are periorbital right greater than left but they could either be bilaterally associated with a lot of nausea as well as over-the-counter and she is only took an over-the-counter medicines and Zofran. She did go the emergency room told her she had vertigo but she knew she did not. She also saw a neurologist who thought she had vertigo which at no point did she really ever describe vertigo symptoms. The headaches have been chronic since July. They. She went to the neuro-ophthalmologist and had by it prisms I believe put in to help her. She saw ENT cardiology she even did vestibular physical therapy with no help. She is tried over-the-counter medications Advil and Imitrex. But we will not use any triptans on her. And and again she is pregnant at this time 9 weeks pregnant.   As soon as she got pregnant symptoms seem to even get a little worse and predominantly lightheadedness and her headaches. She had an MRI done that was apparently normal and the CT scan that was normal.  She now is lightheaded dizzy fatigued weak her heart rates up she went to the emergency room because she thought she was dehydrated the IV bolster and the she says she threw up all of the IV bolus. She also was told that she really did not have dehydration at that time. Which I find hard to believe but I do believe that is what they had told her. Cardiology does not know why she is chronically lightheaded but referred her to the [facility] down in [place] but she cannot go there because it is too expensive they are sending her there for pots syndrome but she is adamant that she does not have POTS syndrome. IMAGING REVIEW:  I REVIEWED PERTINENT  IMAGES AND REPORTS WITH THE PATIENT PERSONALLY, DIRECTLY AND FULLY.      Past Medical History:  Past Medical History:   Diagnosis Date    Urinary tract infection, site not specified     Vesicoureteral reflux, unspecified or without reflux nephropathy 1/7/2014       Past Surgical History:  Past Surgical History:   Procedure Laterality Date    UROLOGICAL SURGERY         Social History:  Social History     Socioeconomic History    Marital status:      Spouse name: Not on file    Number of children: Not on file    Years of education: Not on file    Highest education level: Not on file   Occupational History    Not on file   Tobacco Use    Smoking status: Never    Smokeless tobacco: Never   Vaping Use    Vaping Use: Never used   Substance and Sexual Activity    Alcohol use: No    Drug use: No    Sexual activity: Yes     Partners: Male     Birth control/protection: Pill   Other Topics Concern    Not on file   Social History Narrative    Not on file     Social Determinants of Health     Financial Resource Strain: Not on file   Food Insecurity: Not on file   Transportation Needs: Not on file   Physical Activity: Not on file   Stress: Not on file   Social Connections: Not on file   Intimate Partner Violence: Not on file   Housing Stability: Not on file       Family History:   Family History   Problem Relation Age of Onset    Hypertension Mother     Anemia Mother     Diabetes Father     Hypertension Father        Current Outpatient Medications on File Prior to Visit   Medication Sig Dispense Refill    ondansetron (ZOFRAN-ODT) 8 MG TBDP disintegrating tablet Place 1 tablet under the tongue every 8 hours as needed for Nausea or Vomiting 30 tablet 1    ondansetron (ZOFRAN-ODT) 4 MG disintegrating tablet Take 2 tablets by mouth 3 times daily as needed for Nausea or Vomiting 21 tablet 0    Cholecalciferol (VITAMIN D3) 50 MCG (2000 UT) CAPS Take 10,000 Units by mouth      ondansetron (ZOFRAN) 4 MG tablet       Prenatal MV-Min-Fe Fum-FA-DHA (PRENATAL 1 PO) Take by mouth      acetaminophen (TYLENOL) 325 MG tablet Take 650 mg by mouth every 6 hours as needed for Pain      Bacillus Coagulans-Inulin (PROBIOTIC) 1-250 BILLION-MG CAPS Take by mouth (Patient not taking: Reported on 2/9/2023)      ibuprofen (ADVIL;MOTRIN) 200 MG tablet Take 200 mg by mouth every 6 hours as needed for Pain (Patient not taking: Reported on 2/9/2023)       No current facility-administered medications on file prior to visit. Allergies   Allergen Reactions    Shellfish-Derived Products Nausea And Vomiting       Review of Systems:  Review of Systems   Constitutional:  Positive for fatigue. HENT: Negative. Eyes: Negative. Respiratory: Negative. Cardiovascular: Negative. Gastrointestinal:  Positive for nausea. Endocrine: Negative. Genitourinary: Negative. Musculoskeletal: Negative. Skin: Negative. Allergic/Immunologic: Negative. Neurological:  Positive for light-headedness and headaches. Hematological: Negative. Psychiatric/Behavioral: Negative. No flowsheet data found. No flowsheet data found. Vitals:    02/09/23 1358   BP: 110/78   Weight: 175 lb (79.4 kg)   Height: 5' 7\" (1.702 m)        Physical Exam  Constitutional:       Appearance: Normal appearance. HENT:      Head: Normocephalic and atraumatic. Eyes:      Extraocular Movements: Extraocular movements intact and EOM normal.   Cardiovascular:      Rate and Rhythm: Normal rate and regular rhythm. Pulses: Normal pulses. Pulmonary:      Effort: Pulmonary effort is normal.   Abdominal:      Palpations: Abdomen is soft. Neurological:      Mental Status: She is alert and oriented to person, place, and time. Gait: Gait is intact. Deep Tendon Reflexes:      Reflex Scores:       Tricep reflexes are 2+ on the right side and 2+ on the left side. Bicep reflexes are 2+ on the right side and 2+ on the left side. Brachioradialis reflexes are 2+ on the right side and 2+ on the left side. Patellar reflexes are 2+ on the right side and 2+ on the left side. Achilles reflexes are 1+ on the right side and 1+ on the left side. Neurologic Exam     Mental Status   Oriented to person, place, and time. Attention: normal. Concentration: normal.   Level of consciousness: alert  Knowledge: good. Cranial Nerves     CN II   Visual fields full to confrontation. CN III, IV, VI   Extraocular motions are normal.     CN VII   Facial expression full, symmetric. Motor Exam   Right arm tone: normal  Left arm tone: normal  Right leg tone: normal  Left leg tone: normal    Gait, Coordination, and Reflexes     Gait  Gait: normal    Tremor   Resting tremor: absent  Intention tremor: absent  Action tremor: absent    Reflexes   Right brachioradialis: 2+  Left brachioradialis: 2+  Right biceps: 2+  Left biceps: 2+  Right triceps: 2+  Left triceps: 2+  Right patellar: 2+  Left patellar: 2+  Right achilles: 1+  Left achilles: 1+        Assessment   Assessment / Plan:    Bertha Palm was seen today for headache.     Diagnoses and all orders for this visit:    Chronic intractable headache, unspecified headache type she and her mom are very upset very frustrated and and quite angry that nobody will take care of her or see her or evaluate her. When in reality as cardiology is already evaluated her they do not know why she is chronically lightheaded but say that her blood pressure is okay and she feels like her blood pressure is just fine. They did refer her to the [facility] down in [place] for the autonomic evaluation i.e. looking for POTS syndrome but the patient is clear to say that she does not have POTS. In regards to the headaches this does not appear to be sagittal sinus thrombosis the only way to honestly know would be to do an MRV and she certainly has no intentions of doing that at this time. And I believe she is concerned she is not sure if the baby is ok. I expressed to her if her headaches get worse or she starts having a further neurologic symptoms that we need to do an MRV. I also recommended sending her to [facility] neurology department if she would like. She does not want to see the neurology department down there but is willing to see the cardiology department down there because she does not have the $5000 that it cost to go to the [facility] down in [place]. Emigrate regards to treating her headaches there is nothing we can offer her for her headaches. But it is her nausea and lightheadedness and seems to be the biggest symptom at this time. Light headed  -     External Referral To Cardiology        The Diagnosis and differential diagnostic considerations, and Rx Tx were reviewed with the patient at length.              Orders Placed This Encounter   Procedures    External Referral To Cardiology     Referral Priority:   Routine     Referral Type:   Eval and Treat     Referral Reason:   Specialty Services Required     Requested Specialty:   Cardiology     Number of Visits Requested:   1          I have spent greater than 50% of visit discussing and counseling of patient  for treatment and diagnostic plan review. Total time 30 min     . Notes: Patient is to continue all medications as directed by prescribing physicians. Continuations on today's visit are made based on the patient's report of current medications.              Dr. John De Anda  Consultation Neurology, Neurodiagnostics and Neurotherapeutics  Neuroelectrophysiology, EEG, EMG  [facility]  [address] [place] [address], [address] [facility]  Phone:  [phone]  Fax:   [phone]

## 2023-02-09 NOTE — TELEPHONE ENCOUNTER
Call from Glenn Sutherland with [facility] [phone] for [facility] stating zofran pump was denied by insurance. Message sent to [facility].

## 2023-02-10 ENCOUNTER — CLINICAL DOCUMENTATION (OUTPATIENT)
Dept: NEUROLOGY | Age: 28
End: 2023-02-10

## 2023-02-10 DIAGNOSIS — R42 LIGHT HEADED: Primary | ICD-10-CM

## 2023-02-10 NOTE — PROGRESS NOTES
The patient was clearly very upset with the appointment that we worked her in for yesterday. Unfortunately I do not have any answers for why she has \"lightheadedness\". We had a lengthy conversation she was very frustrated. She clearly interpreted everything I said or did has a negative. We will send her down to [facility] cardiology department and neurology department. Hopefully they can make heads or tails of her symptoms.

## 2023-02-10 NOTE — TELEPHONE ENCOUNTER
[facility]. Pt notified she must contact Medicaid to select a \"Managed plan\". Pt agree's and states she will call today.

## 2023-02-10 NOTE — PROGRESS NOTES
Though still very upset with the appointment. We are going to move forward with [facility] cardiology department most probably concerned with POTS syndrome. And we will move forward with neurology at [facility] to see if they can help her.